# Patient Record
Sex: FEMALE | Race: WHITE | NOT HISPANIC OR LATINO | Employment: OTHER | URBAN - METROPOLITAN AREA
[De-identification: names, ages, dates, MRNs, and addresses within clinical notes are randomized per-mention and may not be internally consistent; named-entity substitution may affect disease eponyms.]

---

## 2017-05-01 ENCOUNTER — ALLSCRIPTS OFFICE VISIT (OUTPATIENT)
Dept: OTHER | Facility: OTHER | Age: 72
End: 2017-05-01

## 2017-06-06 ENCOUNTER — ALLSCRIPTS OFFICE VISIT (OUTPATIENT)
Dept: OTHER | Facility: OTHER | Age: 72
End: 2017-06-06

## 2017-07-17 ENCOUNTER — ALLSCRIPTS OFFICE VISIT (OUTPATIENT)
Dept: OTHER | Facility: OTHER | Age: 72
End: 2017-07-17

## 2017-08-04 DIAGNOSIS — C50.919 MALIGNANT NEOPLASM OF FEMALE BREAST (HCC): ICD-10-CM

## 2017-08-09 ENCOUNTER — ALLSCRIPTS OFFICE VISIT (OUTPATIENT)
Dept: OTHER | Facility: OTHER | Age: 72
End: 2017-08-09

## 2017-08-30 ENCOUNTER — ALLSCRIPTS OFFICE VISIT (OUTPATIENT)
Dept: OTHER | Facility: OTHER | Age: 72
End: 2017-08-30

## 2017-11-14 ENCOUNTER — ALLSCRIPTS OFFICE VISIT (OUTPATIENT)
Dept: OTHER | Facility: OTHER | Age: 72
End: 2017-11-14

## 2018-01-09 NOTE — PSYCH
Psych Med Mgmt    Appearance: was calm and cooperative, adequate hygiene and grooming and good eye contact  Observed mood: was dysphoric  Observed mood: affect was blunted and affect was constricted, but affect appropriate  Speech: garbled  Thought processes: tangential  and disorganized  Hallucinations: tactile hallucinations  Thought Content: persecutory delusions  Abnormal Thoughts: The patient has no suicidal thoughts and no homicidal thoughts  Orientation: The patient is oriented to person, place and time  Recent and Remote Memory: short term memory intact and long term memory intact  Attention Span And Concentration: concentration impaired  Insight: Poor insight  Judgment: Her judgment was impaired  Muscle Strength And Tone  Muscle strength and tone were normal  Normal gait and station  The patient is experiencing no localized pain  Treatment Recommendations: Increase Invega to 9 mg daily and continue with other medications the same  Return to the office in 6 weeks  Risks, Benefits And Possible Side Effects Of Medications: Risks, benefits, and possible side effects of medications explained to patient and patient verbalizes understanding  She reports normal appetite, normal energy level, no weight change and normal number of sleep hours  The patient was seen for continuing care and pharmacotherapy of Schizoaffective disorder and was accompanied I her ICM  The patient was recently admitted to [facility] older adult unit and her medications were changed  She was taken off of Haldol decanoate and Saphris and placed on Invega and Haldol oral  She was maintained on divalproex but carbamazepine was discontinued  According to her ICM, the patient has decompensated  She is now fixated on somebody come into her room every night having sex with her and she shows some scratch marks on her wrists as the evidence  She has loose associations and inappropriate affect  However, she has not been crying as much  Assessment    1  Schizoaffective disorder (295 70) (F25 9)    Plan    1  Paliperidone ER 9 MG Oral Tablet Extended Release 24 Hour (Invega); TAKE 1   TABLET DAILY    Review of Systems    Constitutional: No fever, no chills, feels well, no tiredness, no recent weight gain or loss  Cardiovascular: no complaints of slow or fast heart rate, no chest pain, no palpitations  Respiratory: no complaints of shortness of breath, no wheezing, no dyspnea on exertion  Gastrointestinal: no complaints of abdominal pain, no constipation, no nausea, no diarrhea, no vomiting  Genitourinary: no complaints of dysuria, no incontinence, no pelvic pain, no urinary frequency  Musculoskeletal: no complaints of arthralgia, no myalgias, no limb pain, no joint stiffness  Integumentary: "scratches all over my body"  Neurological: no complaints of headache, no confusion, no numbness, no dizziness  Active Problems    1  Adenocarcinoma of breast (174 9) (C50 919)   2  Macrocytosis (289 89) (D75 89)   3  Schizoaffective disorder (295 70) (F25 9)    Allergies    1  No Known Drug Allergies    Current Meds   1  Ammonium Lactate 12 % External Lotion; Therapy: 43PTG8946 to Recorded   2  Aspirin EC 81 MG Oral Tablet Delayed Release; Therapy: (Recorded:06Jun2012) to Recorded   3  BuPROPion HCl ER (SR) 150 MG Oral Tablet Extended Release 12 Hour; TAKE 1   TABLET TWICE DAILY; Therapy: 81BPI8504 to (Evaluate:14Tnf8209); Last CC:00ZLG7020 Ordered   4  ClonazePAM 1 MG Oral Tablet; TAKE 1 TABLET AT BEDTIME (8pm); Therapy: 89EXZ8032 to (Last WP:48CVL4924) Ordered   5  Divalproex Sodium 125 MG Oral Capsule Delayed Release Sprinkle; Take 2 capsules   PO four times daily; Therapy: 90ZXS3519 to (Evaluate:33Xbv5757); Last CK:37NAC5425 Ordered   6  Docusate Sodium 100 MG Oral Tablet; Therapy: (Recorded:11Jun2014) to Recorded   7  Famotidine 20 MG Oral Tablet;    Therapy: (Recorded:06Jun2012) to Recorded   8  Haloperidol 2 MG Oral Tablet; Take 1 tablet PO two times daily; Therapy: 30YIO7095 to (Evaluate:58Pmq3130); Last TV:01VHE5748 Ordered   9  Oscal 500/200 D-3 TABS; Therapy: (Recorded:06Jun2012) to Recorded   10  Paliperidone ER 6 MG Oral Tablet Extended Release 24 Hour; TAKE 1 TABLET DAILY; Therapy: 53KTS3874 to (Evaluate:21Ndj8034); Last ZD:78BAO1095 Ordered   11  Senna Lax 8 6 MG Oral Tablet; Therapy: (Recorded:06Jun2012) to Recorded   12  X-Viate 40 % CREA; Therapy: 59IUP6694 to Recorded    Family Psych History  Mother    1  No pertinent family history    Social History    · Never A Smoker    End of Encounter Meds    1  BuPROPion HCl ER (SR) 150 MG Oral Tablet Extended Release 12 Hour; TAKE 1   TABLET TWICE DAILY; Therapy: 62RAA4598 to (Evaluate:89Pxv3380); Last DD:63MGI0721 Ordered   2  ClonazePAM 1 MG Oral Tablet; TAKE 1 TABLET AT BEDTIME (8pm); Therapy: 23OBQ1694 to (Last YH:69PNW4970) Ordered   3  Divalproex Sodium 125 MG Oral Capsule Delayed Release Sprinkle (Depakote   Sprinkles); Take 2 capsules PO four times daily; Therapy: 44BGN7018 to (Evaluate:94Qkl0618); Last NW:73KUT3202 Ordered   4  Haloperidol 2 MG Oral Tablet; Take 1 tablet PO two times daily; Therapy: 12CYQ7263 to (Evaluate:37Rqu7498); Last XY:28NGR0774 Ordered   5  Paliperidone ER 9 MG Oral Tablet Extended Release 24 Hour (Invega); TAKE 1 TABLET   DAILY; Therapy: 21LYK8932 to (Evaluate:47Ctf0947)  Requested for: 26DMH4654; Last   Rx:06Jun2017 Ordered    6  Ammonium Lactate 12 % External Lotion; Therapy: 59YIA3974 to Recorded   7  Aspirin EC 81 MG Oral Tablet Delayed Release; Therapy: (Recorded:06Jun2012) to Recorded   8  Docusate Sodium 100 MG Oral Tablet; Therapy: (Recorded:11Jun2014) to Recorded   9  Famotidine 20 MG Oral Tablet; Therapy: (Recorded:06Jun2012) to Recorded   10  Oscal 500/200 D-3 TABS; Therapy: (Recorded:06Jun2012) to Recorded   11  Senna Lax 8 6 MG Oral Tablet;     Therapy: (JQBCOF:53DMA3863) to Recorded   12  X-Viate 40 % CREA;     Therapy: 80CTB9056 to Recorded    Future Appointments    Date/Time Provider Specialty Site   07/26/2017 01:00 PM Celine Alejandre MD Hematology Oncology [facility]     Signatures   Electronically signed by : Keren Vidales MD; Jun 6 2017  4:36PM EST                       (Author)

## 2018-01-10 NOTE — PSYCH
Psych Med Mgmt    Appearance: was calm and cooperative  Observed mood: mood appropriate  Observed mood: Amie Blend affect inappropriate  Speech: a normal rate  Thought processes: disorganized  Hallucinations: auditory hallucinations and visual hallucinations  Thought Content: persecutory delusions  Abnormal Thoughts: The patient has no suicidal thoughts and no homicidal thoughts  Orientation: The patient is oriented to person, place and time  Recent and Remote Memory: short term memory intact and long term memory intact  Attention Span And Concentration: concentration impaired  Insight: Limited insight  Judgment: Her judgment was impaired  Treatment Recommendations: follow up in 3 months  She reports normal appetite, normal energy level, no weight change and normal number of sleep hours  Patient is accompanied by her ICM  She reports that she has been feeling well  She is compliant with medications and is tolerating them without issue  The patient does express some visual and auditory hallucinations, stating that she believes people are making threats to keep her from going to the bathroom at night  She does have occasional episodes of crying when the voices offend her, but she has not had any episodes of aggression or behavioral outbursts  She is eating and sleeping well  Assessment    1  Schizoaffective disorder (295 70) (F25 9)    Review of Systems    Constitutional: No fever, no chills, feels well, no tiredness, no recent weight gain or loss  Cardiovascular: occasional ankle edema, but no complaints of slow or fast heart rate, no chest pain, no palpitations  Respiratory: no complaints of shortness of breath, no wheezing, no dyspnea on exertion  Gastrointestinal: no complaints of abdominal pain, no constipation, no nausea, no diarrhea, no vomiting  Genitourinary: no complaints of dysuria, no incontinence, no pelvic pain, no urinary frequency     Musculoskeletal: no complaints of arthralgia, no myalgias, no limb pain, no joint stiffness  Integumentary: no complaints of skin rash, no itching, no dry skin  Neurological: no complaints of headache, no confusion, no numbness, no dizziness  Active Problems    1  Adenocarcinoma of breast (174 9) (C50 919)   2  Macrocytosis (289 89) (D75 89)   3  Schizoaffective disorder (295 70) (F25 9)    Allergies    1  No Known Drug Allergies    Current Meds   1  Ammonium Lactate 12 % External Lotion; Therapy: 73DUL5781 to Recorded   2  Aspirin EC 81 MG Oral Tablet Delayed Release; Therapy: (Recorded:06Jun2012) to Recorded   3  BusPIRone HCl - 15 MG Oral Tablet; Take 1 tablet twice daily  Requested for: 72GGT2552;   Last Rx:22Jun2016 Ordered   4  CarBAMazepine  MG Oral Tablet Extended Release 12 Hour; TAKE 1 TABLET   TWICE DAILY  Requested for: 77YMP3094; Last Rx:22Jun2016 Ordered   5  Divalproex Sodium  MG Oral Tablet Extended Release 24 Hour; Take 1 tablet   twice daily  Requested for: 78JRS8435; Last Rx:22Jun2016 Ordered   6  Docusate Sodium 100 MG Oral Tablet; Therapy: (Recorded:11Jun2014) to Recorded   7  Famotidine 20 MG Oral Tablet; Therapy: (Recorded:06Jun2012) to Recorded   8  FLUoxetine HCl - 20 MG Oral Capsule; TAKE ONE CAPSULE BY MOUTH EVERY DAY; Therapy: 89BFY9815 to (Evaluate:17Vzx8046)  Requested for: 47HZD2087; Last   Rx:22Jun2016 Ordered   9  Haloperidol Decanoate 100 MG/ML Intramuscular Solution; INJECT 1 5 ML Other 0nce a   month  Requested for: 52USH3160; Last Rx:22Jun2016 Ordered   10  LORazepam 1 MG Oral Tablet; TAKE 1 TABLET 3 times daily; Last Rx:22Jun2016    Ordered   11  Oscal 500/200 D-3 TABS; Therapy: (Recorded:06Jun2012) to Recorded   12  Saphris 10 MG Sublingual Tablet Sublingual; 1 TAB UNDER THE TONGUE TWICE DAILY    DX:PSYCHOSIS *SPECIAL CYCLE*;    Therapy: 20Jun2016 to (Last Rx:22Jun2016)  Requested for: 00GOW0547 Ordered   13  Senna Lax 8 6 MG Oral Tablet;     Therapy: (HTHTNIJZ:44MXC8591) to Recorded   14  Trihexyphenidyl HCl - 2 MG Oral Tablet; Take 1 tablet twice daily  Requested for:    55MOG9048; Last Rx:22Jun2016 Ordered   15  X-Viate 40 % CREA; Therapy: 42FFP4266 to Recorded    Family Psych History  Mother    1  No pertinent family history    Social History    · Never A Smoker    End of Encounter Meds    1  BusPIRone HCl - 15 MG Oral Tablet; Take 1 tablet twice daily  Requested for: 22Jun2016;   Last Rx:22Jun2016 Ordered   2  CarBAMazepine  MG Oral Tablet Extended Release 12 Hour (TEGretol-XR); TAKE   1 TABLET TWICE DAILY  Requested for: 76QEY3732; Last Rx:22Jun2016 Ordered   3  Divalproex Sodium  MG Oral Tablet Extended Release 24 Hour; Take 1 tablet   twice daily  Requested for: 46VNH2092; Last Rx:22Jun2016 Ordered   4  FLUoxetine HCl - 20 MG Oral Capsule; TAKE ONE CAPSULE BY MOUTH EVERY DAY; Therapy: 53IDB5516 to (Evaluate:46Fzd3009)  Requested for: 67GKD1179; Last   Rx:22Jun2016 Ordered   5  Haloperidol Decanoate 100 MG/ML Intramuscular Solution; INJECT 1 5 ML Other 0nce a   month  Requested for: 31MAV7535; Last Rx:22Jun2016 Ordered   6  LORazepam 1 MG Oral Tablet; TAKE 1 TABLET 3 times daily; Last Rx:22Jun2016   Ordered   7  Saphris 10 MG Sublingual Tablet Sublingual; 1 TAB UNDER THE TONGUE TWICE DAILY   DX:PSYCHOSIS *SPECIAL CYCLE*;   Therapy: 20Jun2016 to (Last Rx:22Jun2016)  Requested for: 60GUK4101 Ordered   8  Trihexyphenidyl HCl - 2 MG Oral Tablet; Take 1 tablet twice daily  Requested for:   07GDJ8759; Last Rx:22Jun2016 Ordered    9  Ammonium Lactate 12 % External Lotion; Therapy: 91ASB6318 to Recorded   10  Aspirin EC 81 MG Oral Tablet Delayed Release; Therapy: (Recorded:06Jun2012) to Recorded   11  Docusate Sodium 100 MG Oral Tablet; Therapy: (Recorded:11Jun2014) to Recorded   12  Famotidine 20 MG Oral Tablet; Therapy: (Recorded:06Jun2012) to Recorded   13  Oscal 500/200 D-3 TABS; Therapy: (Recorded:06Jun2012) to Recorded   14  Senna Lax 8 6 MG Oral Tablet; Therapy: (Recorded:06Jun2012) to Recorded   15  X-Viate 40 % CREA;     Therapy: 22CHN6360 to Recorded    Future Appointments    Date/Time Provider Specialty Site   07/26/2017 01:00 PM Haleigh Stewart MD Hematology Oncology [facility]     Signatures   Electronically signed by : Sergio Martinez MD; Dec 28 2016  3:55PM EST                       (Author)

## 2018-01-13 VITALS
WEIGHT: 181.13 LBS | RESPIRATION RATE: 16 BRPM | HEART RATE: 86 BPM | BODY MASS INDEX: 28.43 KG/M2 | TEMPERATURE: 96.1 F | HEIGHT: 67 IN | OXYGEN SATURATION: 97 % | DIASTOLIC BLOOD PRESSURE: 72 MMHG | SYSTOLIC BLOOD PRESSURE: 114 MMHG

## 2018-01-13 NOTE — PSYCH
Psych Med Mgmt    Appearance: was calm and cooperative and poor eye contact  Observed mood: was dysphoric  Observed mood: affect was blunted and affect was constricted   some grimacing and also inappropriate/mood incongruent affect  Speech: a normal rate  Thought processes: tangential  and disorganized  Hallucinations: auditory hallucinations   voices make threatening comments  Thought Content: persecutory delusions  Abnormal Thoughts: The patient has no suicidal thoughts and no homicidal thoughts  Orientation: The patient is oriented to time  Recent and Remote Memory: short term memory intact and long term memory intact  Attention Span And Concentration: concentration impaired  Insight: Poor insight  Judgment: Her judgment was limited  Muscle Strength And Tone  mlid oral dyskinesia  The patient is experiencing no localized pain  She reports normal appetite, normal energy level, no weight change and normal number of sleep hours  The patient was seen for continuing care and pharmacotherapy accompanied by her ICM  There has been no change in her clinical status  She continues to have residual psychotic symptoms  She believes that somebody goes to her group home at night and gives other residents ECT and she also believes that the other residents 1 her to fail in life  Hallucinations are a sporadic  Voices tell her that if she has a bowel movement she will be sentenced to penitentiary  However the patient has not had any significant behavioral issues and seems to be at her baseline  Other than mild oral dyskinesia, she didn't appear to have any other side effects  Assessment    1  Schizoaffective disorder (295 70) (F25 9)    Plan    1  BusPIRone HCl - 15 MG Oral Tablet; Take 1 tablet twice daily   2  CarBAMazepine  MG Oral Tablet Extended Release 12 Hour   (TEGretol-XR); TAKE 1 TABLET TWICE DAILY   3  Divalproex Sodium  MG Oral Tablet Extended Release 24 Hour;  Take 1   tablet twice daily   4  FLUoxetine HCl - 20 MG Oral Capsule; TAKE ONE CAPSULE BY MOUTH EVERY   DAY   5  Haloperidol Decanoate 100 MG/ML Intramuscular Solution; INJECT 1 5 ML Other   0nce a month   6  LORazepam 1 MG Oral Tablet; TAKE 1 TABLET 3 times daily   7  Saphris 10 MG Sublingual Tablet Sublingual; PLACE 1 TABLET Twice daily   8  Trihexyphenidyl HCl - 2 MG Oral Tablet; Take 1 tablet twice daily    Review of Systems    Constitutional: No fever, no chills, feels well, no tiredness, no recent weight gain or loss  Cardiovascular: no complaints of slow or fast heart rate, no chest pain, no palpitations  Respiratory: no complaints of shortness of breath, no wheezing, no dyspnea on exertion  Gastrointestinal: no complaints of abdominal pain, no constipation, no nausea, no diarrhea, no vomiting  Genitourinary: no complaints of dysuria, no incontinence, no pelvic pain, no urinary frequency  Musculoskeletal: no complaints of arthralgia, no myalgias, no limb pain, no joint stiffness  Integumentary: no complaints of skin rash, no itching, no dry skin  Neurological: no complaints of headache, no confusion, no numbness, no dizziness  Active Problems    1  Adenocarcinoma of breast (174 9) (C50 919)   2  Macrocytosis (289 89) (D75 89)   3  Schizoaffective disorder (295 70) (F25 9)    Allergies    1  No Known Drug Allergies    Current Meds   1  Ammonium Lactate 12 % External Lotion; Therapy: 61GLL0023 to Recorded   2  Aspirin EC 81 MG Oral Tablet Delayed Release; Therapy: (Recorded:06Jun2012) to Recorded   3  BusPIRone HCl - 15 MG Oral Tablet; Take 1 tablet twice daily; Last Rx:29Oct2015   Ordered   4  CarBAMazepine  MG Oral Tablet Extended Release 12 Hour; TAKE 1 TABLET   TWICE DAILY; Last Rx:29Oct2015 Ordered   5  Divalproex Sodium  MG Oral Tablet Extended Release 24 Hour; Take 1 tablet   twice daily; Last Rx:29Oct2015 Ordered   6  Docusate Sodium 100 MG Oral Tablet;    Therapy: (Recorded:11Jun2014) to Recorded   7  Famotidine 20 MG Oral Tablet; Therapy: (Recorded:06Jun2012) to Recorded   8  FLUoxetine HCl - 20 MG Oral Capsule; TAKE ONE CAPSULE BY MOUTH EVERY DAY; Therapy: 15UOH4302 to (Fidel Jackson)  Requested for: 0490 39 07 81; Last   Rx:29Oct2015 Ordered   9  Haloperidol Decanoate 100 MG/ML Intramuscular Solution; INJECT 1 5 ML Other 0nce a   month; Last Rx:29Oct2015 Ordered   10  LORazepam 1 MG Oral Tablet; TAKE 1 TABLET 3 times daily; Last Rx:29Oct2015 Ordered   11  Oscal 500/200 D-3 TABS; Therapy: (Recorded:06Jun2012) to Recorded   12  Saphris 10 MG Sublingual Tablet Sublingual; PLACE 1 TABLET Twice daily; Last    Rx:29Oct2015 Ordered   13  Senna Lax 8 6 MG Oral Tablet; Therapy: (Recorded:06Jun2012) to Recorded   14  Trihexyphenidyl HCl - 2 MG Oral Tablet; Take 1 tablet twice daily; Last Rx:29Oct2015    Ordered   15  X-Viate 40 % CREA; Therapy: 95VZA5945 to Recorded    Family Psych History    1  No pertinent family history    Social History    · Never A Smoker    End of Encounter Meds    1  BusPIRone HCl - 15 MG Oral Tablet; Take 1 tablet twice daily; Last Rx:02Hbw5745   Ordered   2  CarBAMazepine  MG Oral Tablet Extended Release 12 Hour (TEGretol-XR); TAKE   1 TABLET TWICE DAILY; Last Rx:54Tiu3075 Ordered   3  Divalproex Sodium  MG Oral Tablet Extended Release 24 Hour; Take 1 tablet   twice daily; Last Rx:08Hlr2478 Ordered   4  FLUoxetine HCl - 20 MG Oral Capsule; TAKE ONE CAPSULE BY MOUTH EVERY DAY; Therapy: 42XDV0751 to (Evaluate:93Gfo0606)  Requested for: 00Rbe2859; Last   Rx:58Yxn0999 Ordered   5  Haloperidol Decanoate 100 MG/ML Intramuscular Solution; INJECT 1 5 ML Other 0nce a   month; Last Rx:25Cuh9861 Ordered   6  LORazepam 1 MG Oral Tablet; TAKE 1 TABLET 3 times daily; Last Rx:14Eih8050   Ordered   7  Saphris 10 MG Sublingual Tablet Sublingual; PLACE 1 TABLET Twice daily; Last   Rx:34Etb2141 Ordered   8  Trihexyphenidyl HCl - 2 MG Oral Tablet;  Take 1 tablet twice daily; Last Rx:85Xrw7572   Ordered    9  Ammonium Lactate 12 % External Lotion; Therapy: 94AKH7603 to Recorded   10  Aspirin EC 81 MG Oral Tablet Delayed Release; Therapy: (Recorded:06Jun2012) to Recorded   11  Docusate Sodium 100 MG Oral Tablet; Therapy: (Recorded:11Jun2014) to Recorded   12  Famotidine 20 MG Oral Tablet; Therapy: (Recorded:06Jun2012) to Recorded   13  Oscal 500/200 D-3 TABS; Therapy: (Recorded:06Jun2012) to Recorded   14  Senna Lax 8 6 MG Oral Tablet; Therapy: (Recorded:06Jun2012) to Recorded   15  X-Viate 40 % CREA;     Therapy: 53RGY4143 to Recorded    Future Appointments    Date/Time Provider Specialty Site   06/08/2016 01:00 PM Siria Fox MD Hematology Oncology [facility]     Signatures   Electronically signed by : Paula Schneider MD; Feb 23 2016  3:51PM EST                       (Author)

## 2018-01-14 NOTE — PSYCH
Psych Med Mgmt    Appearance: adequate hygiene and grooming and good eye contact   squinting frequently  Observed mood: uncertain  Observed mood:  inappropriate and shallow at times  Speech: a normal rate and speech soft  Thought processes: disorganized  Thought Content: persecutory delusions  Abnormal Thoughts: The patient has no suicidal thoughts and no homicidal thoughts  Orientation: The patient is oriented to person, place and time  Recent and Remote Memory: short term memory intact and long term memory intact  Attention Span And Concentration: concentration impaired  Insight: Poor insight  Judgment: Her judgment was impaired  Muscle Strength And Tone  Muscle strength and tone were normal  Normal gait and station  Fund of knowledge: Patient displays adequate knowledge of current events  The patient is experiencing no localized pain  She reports normal appetite, decreased energy, no weight change and decrease in number of sleep hours   seen for schizoaffective disorder accompanied by her ICM  No changes in clinical status with persistence of residual signs and symptoms  No disruptive behaviors  She often talks about police coming to take her to have ECT Or that other people Getting to her bank accounts and things like that  No evidence of tardive dyskinesia  Assessment    1  Schizoaffective disorder (295 70) (F25 9)    Review of Systems    Constitutional: No fever, no chills, feels well, no tiredness, no recent weight gain or loss  Cardiovascular: no complaints of slow or fast heart rate, no chest pain, no palpitations  Respiratory: no complaints of shortness of breath, no wheezing, no dyspnea on exertion  Gastrointestinal: no complaints of abdominal pain, no constipation, no nausea, no diarrhea, no vomiting  Genitourinary: no complaints of dysuria, no incontinence, no pelvic pain, no urinary frequency     Musculoskeletal: no complaints of arthralgia, no myalgias, no limb pain, no joint stiffness  Active Problems    1  Adenocarcinoma of breast (174 9) (C50 919)   2  Macrocytosis (289 89) (D75 89)   3  Schizoaffective disorder (295 70) (F25 9)    Allergies    1  No Known Drug Allergies    Current Meds   1  Ammonium Lactate 12 % External Lotion; Therapy: 09PPL6204 to Recorded   2  Aspirin EC 81 MG Oral Tablet Delayed Release; Therapy: (Recorded:06Jun2012) to Recorded   3  BusPIRone HCl - 15 MG Oral Tablet; Take 1 tablet twice daily  Requested for: 64LNC5153;   Last Rx:22Jun2016 Ordered   4  CarBAMazepine  MG Oral Tablet Extended Release 12 Hour; TAKE 1 TABLET   TWICE DAILY  Requested for: 30QJW7286; Last Rx:22Jun2016 Ordered   5  Divalproex Sodium  MG Oral Tablet Extended Release 24 Hour; Take 1 tablet   twice daily  Requested for: 25KCU9094; Last Rx:22Jun2016 Ordered   6  Docusate Sodium 100 MG Oral Tablet; Therapy: (Recorded:11Jun2014) to Recorded   7  Famotidine 20 MG Oral Tablet; Therapy: (Recorded:06Jun2012) to Recorded   8  FLUoxetine HCl - 20 MG Oral Capsule; TAKE ONE CAPSULE BY MOUTH EVERY DAY; Therapy: 04EZI6604 to (Evaluate:02Sdv3134)  Requested for: 95QFY3028; Last   Rx:22Jun2016 Ordered   9  Haloperidol Decanoate 100 MG/ML Intramuscular Solution; INJECT 1 5 ML Other 0nce a   month  Requested for: 90XHK3865; Last Rx:22Jun2016 Ordered   10  LORazepam 1 MG Oral Tablet; TAKE 1 TABLET 3 times daily; Last Rx:22Jun2016    Ordered   11  Oscal 500/200 D-3 TABS; Therapy: (Recorded:06Jun2012) to Recorded   12  Saphris 10 MG Sublingual Tablet Sublingual; 1 TAB UNDER THE TONGUE TWICE DAILY    DX:PSYCHOSIS *SPECIAL CYCLE*;    Therapy: 20Jun2016 to (Last Rx:22Jun2016)  Requested for: 19PZO6442 Ordered   13  Senna Lax 8 6 MG Oral Tablet; Therapy: (Recorded:06Jun2012) to Recorded   14  Trihexyphenidyl HCl - 2 MG Oral Tablet; Take 1 tablet twice daily  Requested for:    31KHT6509; Last Rx:22Jun2016 Ordered   15  X-Viate 40 % CREA;     Therapy: 81OYF6426 to Recorded    Family Psych History  Mother    1  No pertinent family history    Social History    · Never A Smoker    End of Encounter Meds    1  BusPIRone HCl - 15 MG Oral Tablet; Take 1 tablet twice daily  Requested for: 22Jun2016;   Last Rx:22Jun2016 Ordered   2  CarBAMazepine  MG Oral Tablet Extended Release 12 Hour (TEGretol-XR); TAKE   1 TABLET TWICE DAILY  Requested for: 79HKF2451; Last Rx:22Jun2016 Ordered   3  Divalproex Sodium  MG Oral Tablet Extended Release 24 Hour; Take 1 tablet   twice daily  Requested for: 46TMC9887; Last Rx:22Jun2016 Ordered   4  FLUoxetine HCl - 20 MG Oral Capsule; TAKE ONE CAPSULE BY MOUTH EVERY DAY; Therapy: 91VHA3910 to (Evaluate:72Rup3288)  Requested for: 56XNO6969; Last   Rx:22Jun2016 Ordered   5  Haloperidol Decanoate 100 MG/ML Intramuscular Solution; INJECT 1 5 ML Other 0nce a   month  Requested for: 84QOX3796; Last Rx:22Jun2016 Ordered   6  LORazepam 1 MG Oral Tablet; TAKE 1 TABLET 3 times daily; Last Rx:22Jun2016   Ordered   7  Saphris 10 MG Sublingual Tablet Sublingual; 1 TAB UNDER THE TONGUE TWICE DAILY   DX:PSYCHOSIS *SPECIAL CYCLE*;   Therapy: 20Jun2016 to (Last Rx:22Jun2016)  Requested for: 76SLQ1881 Ordered   8  Trihexyphenidyl HCl - 2 MG Oral Tablet; Take 1 tablet twice daily  Requested for:   05VBY6353; Last Rx:22Jun2016 Ordered    9  Ammonium Lactate 12 % External Lotion; Therapy: 48KSY6975 to Recorded   10  Aspirin EC 81 MG Oral Tablet Delayed Release; Therapy: (Recorded:06Jun2012) to Recorded   11  Docusate Sodium 100 MG Oral Tablet; Therapy: (Recorded:11Jun2014) to Recorded   12  Famotidine 20 MG Oral Tablet; Therapy: (Recorded:06Jun2012) to Recorded   13  Oscal 500/200 D-3 TABS; Therapy: (Recorded:06Jun2012) to Recorded   14  Senna Lax 8 6 MG Oral Tablet; Therapy: (Recorded:06Jun2012) to Recorded   15  X-Viate 40 % CREA;     Therapy: 30HJA8134 to Recorded    Future Appointments    Date/Time Provider Specialty Site 07/26/2017 01:00 PM Olinda Campos MD Hematology Oncology [facility]     Signatures   Electronically signed by : Sophie Zhu MD; Oct  5 2016  3:16PM EST                       (Author)

## 2018-01-15 NOTE — PSYCH
Psych Med Mgmt    Appearance: adequate hygiene and grooming and poor eye contact  Observed mood: euthymic  Observed mood:  inappropriate laughing  Speech: a normal rate  Thought processes: tangential  and disorganized  Hallucinations: auditory hallucinations  Thought Content: paranoid ideation  Abnormal Thoughts: The patient has no suicidal thoughts and no homicidal thoughts  Orientation: The patient is oriented to person, place and time  Attention Span And Concentration: concentration impaired  Insight: Poor insight  Judgment: Her judgment was limited  The patient is experiencing no localized pain  Treatment Recommendations: Continue with same medications and return to the office in 3 months  Risks, Benefits And Possible Side Effects Of Medications: Patient does not verbalize understanding at this time and will require further explanation  She reports normal appetite, normal energy level, no weight change and normal number of sleep hours  The patient was seen for treatment of his schizoaffective disorder accompanied by her ICM  She continues to have residual symptoms of his schizophrenia such as ideas of reference and auditory hallucinations and inappropriate affect  No EPS was noted  Assessment    1  Schizoaffective disorder (295 70) (F25 9)    Plan    1  BusPIRone HCl - 15 MG Oral Tablet; Take 1 tablet twice daily   2  CarBAMazepine  MG Oral Tablet Extended Release 12 Hour   (TEGretol-XR); TAKE 1 TABLET TWICE DAILY   3  Divalproex Sodium  MG Oral Tablet Extended Release 24 Hour; Take 1   tablet twice daily   4  FLUoxetine HCl - 20 MG Oral Capsule; TAKE ONE CAPSULE BY MOUTH EVERY   DAY   5  Haloperidol Decanoate 100 MG/ML Intramuscular Solution; INJECT 1 5 ML Other   0nce a month   6  LORazepam 1 MG Oral Tablet; TAKE 1 TABLET 3 times daily   7   Saphris 10 MG Sublingual Tablet Sublingual; 1 TAB UNDER THE TONGUE   TWICE DAILY DX:PSYCHOSIS *SPECIAL CYCLE*   8  Trihexyphenidyl HCl - 2 MG Oral Tablet; Take 1 tablet twice daily    Review of Systems    Constitutional: No fever, no chills, feels well, no tiredness, no recent weight gain or loss  Cardiovascular: no complaints of slow or fast heart rate, no chest pain, no palpitations  Respiratory: no complaints of shortness of breath, no wheezing, no dyspnea on exertion  Gastrointestinal: no complaints of abdominal pain, no constipation, no nausea, no diarrhea, no vomiting  Musculoskeletal: no complaints of arthralgia, no myalgias, no limb pain, no joint stiffness  Active Problems    1  Adenocarcinoma of breast (174 9) (C50 919)   2  Macrocytosis (289 89) (D75 89)   3  Schizoaffective disorder (295 70) (F25 9)    Allergies    1  No Known Drug Allergies    Current Meds   1  Ammonium Lactate 12 % External Lotion; Therapy: 14WXP6927 to Recorded   2  Aspirin EC 81 MG Oral Tablet Delayed Release; Therapy: (Recorded:06Jun2012) to Recorded   3  BusPIRone HCl - 15 MG Oral Tablet; Take 1 tablet twice daily  Requested for:   23Feb2016; Last Rx:23Feb2016 Ordered   4  CarBAMazepine  MG Oral Tablet Extended Release 12 Hour; TAKE 1 TABLET   TWICE DAILY  Requested for: 23Feb2016; Last Rx:23Feb2016 Ordered   5  Divalproex Sodium  MG Oral Tablet Extended Release 24 Hour; Take 1 tablet   twice daily  Requested for: 23Feb2016; Last Rx:23Feb2016 Ordered   6  Docusate Sodium 100 MG Oral Tablet; Therapy: (Recorded:11Jun2014) to Recorded   7  Famotidine 20 MG Oral Tablet; Therapy: (Recorded:06Jun2012) to Recorded   8  FLUoxetine HCl - 20 MG Oral Capsule; TAKE ONE CAPSULE BY MOUTH EVERY DAY; Therapy: 65NTJ8058 to (Evaluate:08Czw2877)  Requested for: 23Feb2016; Last   Rx:23Feb2016 Ordered   9  Haloperidol Decanoate 100 MG/ML Intramuscular Solution; INJECT 1 5 ML Other 0nce a   month; Last Rx:23Feb2016 Ordered   10  LORazepam 1 MG Oral Tablet; TAKE 1 TABLET 3 times daily; Last Rx:23Feb2016    Ordered   11   Dariana White 500/200 D-3 TABS; Therapy: (Recorded:06Jun2012) to Recorded   12  Saphris 10 MG Sublingual Tablet Sublingual; 1 TAB UNDER THE TONGUE TWICE DAILY    DX:PSYCHOSIS *SPECIAL CYCLE*;    Therapy: 20Jun2016 to (Last Rx:20Jun2016)  Requested for: 20Jun2016 Ordered   13  Senna Lax 8 6 MG Oral Tablet; Therapy: (Recorded:06Jun2012) to Recorded   14  Trihexyphenidyl HCl - 2 MG Oral Tablet; Take 1 tablet twice daily  Requested for:    72Elg9519; Last Rx:98Dfp8236 Ordered   15  X-Viate 40 % CREA; Therapy: 65NWO7275 to Recorded    Family Psych History  Mother    1  No pertinent family history    Social History    · Never A Smoker    End of Encounter Meds    1  BusPIRone HCl - 15 MG Oral Tablet; Take 1 tablet twice daily  Requested for: 22Jun2016;   Last Rx:22Jun2016 Ordered   2  CarBAMazepine  MG Oral Tablet Extended Release 12 Hour (TEGretol-XR); TAKE   1 TABLET TWICE DAILY  Requested for: 46GGB8630; Last Rx:22Jun2016 Ordered   3  Divalproex Sodium  MG Oral Tablet Extended Release 24 Hour; Take 1 tablet   twice daily  Requested for: 05PWV6558; Last Rx:22Jun2016 Ordered   4  FLUoxetine HCl - 20 MG Oral Capsule; TAKE ONE CAPSULE BY MOUTH EVERY DAY; Therapy: 99UOT9786 to (Evaluate:08Xwm2598)  Requested for: 71QUO2445; Last   Rx:22Jun2016 Ordered   5  Haloperidol Decanoate 100 MG/ML Intramuscular Solution; INJECT 1 5 ML Other 0nce a   month  Requested for: 39HAK7286; Last Rx:22Jun2016 Ordered   6  LORazepam 1 MG Oral Tablet; TAKE 1 TABLET 3 times daily; Last Rx:22Jun2016   Ordered   7  Saphris 10 MG Sublingual Tablet Sublingual; 1 TAB UNDER THE TONGUE TWICE DAILY   DX:PSYCHOSIS *SPECIAL CYCLE*;   Therapy: 20Jun2016 to (Last Rx:22Jun2016)  Requested for: 31MMB4727 Ordered   8  Trihexyphenidyl HCl - 2 MG Oral Tablet; Take 1 tablet twice daily  Requested for:   78LXR2029; Last Rx:22Jun2016 Ordered    9  Ammonium Lactate 12 % External Lotion; Therapy: 74FAA9529 to Recorded   10   Aspirin EC 81 MG Oral Tablet Delayed Release; Therapy: (Recorded:06Jun2012) to Recorded   11  Docusate Sodium 100 MG Oral Tablet; Therapy: (Recorded:11Jun2014) to Recorded   12  Famotidine 20 MG Oral Tablet; Therapy: (Recorded:06Jun2012) to Recorded   13  Oscal 500/200 D-3 TABS; Therapy: (Recorded:06Jun2012) to Recorded   14  Senna Lax 8 6 MG Oral Tablet; Therapy: (Recorded:06Jun2012) to Recorded   15  X-Viate 40 % CREA;     Therapy: 71VAJ4148 to Recorded    Future Appointments    Date/Time Provider Specialty Site   07/27/2016 01:00 PM Haleigh Stewart MD Hematology Oncology [facility]     Signatures   Electronically signed by : Sergio Martinez MD; Jun 22 2016  4:53PM EST                       (Author)

## 2018-01-15 NOTE — PSYCH
Psych Med Mgmt    Appearance: was calm and cooperative, adequate hygiene and grooming and good eye contact  Observed mood: euthymic  Observed mood:  inappropriate  Speech: pressured, but garbled  Thought processes: disorganized  Hallucinations: uncertain hallucinations  Thought Content: persecutory delusions  Abnormal Thoughts: The patient has no suicidal thoughts and no homicidal thoughts  Orientation: The patient is oriented to person, place and time  Recent and Remote Memory: short term memory intact and long term memory intact  Attention Span And Concentration: concentration impaired  Insight: Limited insight  Judgment: Her judgment was limited  Muscle Strength And Tone  Muscle strength and tone were normal  Normal gait and station  The patient is experiencing no localized pain  Treatment Recommendations: continue with same medications  Risks, Benefits And Possible Side Effects Of Medications: Risks, benefits, and possible side effects of medications explained to patient and patient verbalizes understanding  She reports normal appetite, normal energy level, no weight change and normal number of sleep hours  seen for schizoaffective disorder  She was accompanied by her   She has had episodes of lightheadedness and has been evaluated at ED and discharged  Seems to be at her baseline  Assessment    1  Schizoaffective disorder (295 70) (F25 9)    Review of Systems    Constitutional: lightheaded and weak  Cardiovascular: palpitations, but as noted in HPI  Respiratory: no complaints of shortness of breath, no wheezing, no dyspnea on exertion  Gastrointestinal: no complaints of abdominal pain, no constipation, no nausea, no diarrhea, no vomiting  Genitourinary: no complaints of dysuria, no incontinence, no pelvic pain, no urinary frequency  Musculoskeletal: no complaints of arthralgia, no myalgias, no limb pain, no joint stiffness     Integumentary: no complaints of skin rash, no itching, no dry skin  Neurological: no complaints of headache, no confusion, no numbness, no dizziness  Active Problems    1  Adenocarcinoma of breast (174 9) (C50 919)   2  Macrocytosis (289 89) (D75 89)   3  Schizoaffective disorder (295 70) (F25 9)    Allergies    1  No Known Drug Allergies    Current Meds   1  APAP 325 MG Oral Tablet; TAKE 1 TO 2 TABLETS EVERY 6 HOURS AS NEEDED; Therapy: (Recorded:09Aug2017) to Recorded   2  Aspirin EC 81 MG Oral Tablet Delayed Release; Therapy: (Recorded:06Jun2012) to Recorded   3  BuPROPion HCl ER (SR) 150 MG Oral Tablet Extended Release 12 Hour; TAKE 1   TABLET TWICE DAILY; Therapy: 05PUR0951 to (Evaluate:30Jul2017); Last MO:38FAS3682 Ordered   4  Calcium Acetate 667 MG Oral Capsule; Take 1 capsule twice daily; [facility] Recorded   5  ClonazePAM 2 MG Oral Tablet; TAKE 1 TABLET AT BEDTIME (8pm); Therapy: 23RQZ8134 to (Last Rx:30Aug2017) Ordered   6  Divalproex Sodium 125 MG Oral Capsule Delayed Release Sprinkle; Take 2 capsules   PO four times daily; Therapy: 12ENE2886 to (Evaluate:30Jul2017); Last BF:96XRP1462 Ordered   7  Famotidine 20 MG Oral Tablet; Therapy: (Recorded:06Jun2012) to Recorded   8  Haloperidol 2 MG Oral Tablet; Take 1 tablet PO two times daily; Therapy: 14FLD5922 to (Evaluate:30Jul2017); Last SE:05AQU5273 Ordered   9  Midodrine HCl - 10 MG Oral Tablet; TAKE 1 TABLET 3 TIMES DAILY; Therapy: ([facility]) to Recorded   10  Paliperidone ER 9 MG Oral Tablet Extended Release 24 Hour; TAKE 1 TABLET DAILY; Therapy: 91FRC8452 to (Evaluate:05Aug2017)  Requested for: 93UWL0547; Last    Rx:06Jun2017 Ordered   11  Senna Lax 8 6 MG TABS; Therapy: (Recorded:06Jun2012) to Recorded   12  Therems M TABS; TAKE 1 TABLET DAILY; [facility]   13  Vitamin D 2000 UNIT Oral Capsule; TAKE 1 CAPSULE Every morning;     Therapy: (Recorded:45Lkq9099) to Recorded    Family Psych History  Mother    1  No pertinent family history    Social History    · Never A Smoker    End of Encounter Meds    1  BuPROPion HCl ER (SR) 150 MG Oral Tablet Extended Release 12 Hour; TAKE 1   TABLET TWICE DAILY; Therapy: 45JWV6663 to (Evaluate:30Jul2017); Last PQ:53RHO8887 Ordered   2  ClonazePAM 2 MG Oral Tablet; TAKE 1 TABLET AT BEDTIME (8pm); Therapy: 95CIK5131 to (Last Rx:85Uqq0025) Ordered   3  Divalproex Sodium 125 MG Oral Capsule Delayed Release Sprinkle (Depakote   Sprinkles); Take 2 capsules PO four times daily; Therapy: 97KNV8072 to (Evaluate:16Zap4863); Last QK:88ATL0243 Ordered   4  Haloperidol 2 MG Oral Tablet; Take 1 tablet PO two times daily; Therapy: 44HEN3314 to (Evaluate:69Ktw6505); Last HW:64QFL0423 Ordered   5  Paliperidone ER 9 MG Oral Tablet Extended Release 24 Hour (Invega); TAKE 1 TABLET   DAILY; Therapy: 48JIQ5866 to (Evaluate:05Aug2017)  Requested for: 64SQT7890; Last   Rx:06Jun2017 Ordered    6  APAP 325 MG Oral Tablet; TAKE 1 TO 2 TABLETS EVERY 6 HOURS AS NEEDED; Therapy: ([facility]) to Recorded   7  Aspirin EC 81 MG Oral Tablet Delayed Release; Therapy: (Recorded:06Jun2012) to Recorded   8  Calcium Acetate 667 MG Oral Capsule; Take 1 capsule twice daily; Therapy: ([facility]) to Recorded   9  Famotidine 20 MG Oral Tablet; Therapy: (Recorded:06Jun2012) to Recorded   10  Midodrine HCl - 10 MG Oral Tablet; TAKE 1 TABLET 3 TIMES DAILY; Therapy: ([facility]) to Recorded   11  Senna Lax 8 6 MG TABS; Therapy: (Recorded:06Jun2012) to Recorded   12  Therems M TABS; TAKE 1 TABLET DAILY; Therapy: ([facility]) to Recorded   13  Vitamin D 2000 UNIT Oral Capsule; TAKE 1 CAPSULE Every morning;     Therapy: ([facility]) to Recorded    Future Appointments    Date/Time Provider Specialty Site   08/08/2018 02:30 PM Nargis Campos MD Hematology Oncology [facility]     Signatures Electronically signed by : Lupe Martin MD; Nov 14 2017  3:23PM EST                       (Author)

## 2018-01-16 NOTE — PSYCH
Psych Med Mgmt    Appearance: was calm and cooperative and good eye contact  Observed mood: euthymic  Observed mood: affect was blunted   inappropriate  Speech: a normal rate and fluent  Thought processes: tangential  and disorganized  Hallucinations: auditory hallucinations  Thought Content: persecutory delusions   delusions with sexual themes  Abnormal Thoughts: The patient has no suicidal thoughts and no homicidal thoughts  Orientation: The patient is oriented to person, place and time  Recent and Remote Memory: short term memory intact and long term memory intact  Attention Span And Concentration: concentration impaired  Insight: Limited insight  Judgment: Her judgment was limited  Muscle Strength And Tone  Muscle strength and tone were normal  Normal gait and station  Language: no difficulty naming common objects and no difficulty repeating a phrase  Fund of knowledge: Patient displays adequate fund of knowledge regarding vocabulary  The patient is experiencing no localized pain  Treatment Recommendations: continue with same meds  No evidence of TD  RTO in 6 weeks  Risks, Benefits And Possible Side Effects Of Medications: Patient does not verbalize understanding at this time and will require further explanation  She reports normal appetite, normal energy level, no weight change and normal number of sleep hours  seen for Schizoaffective disorder accompanied by her ICM  She seems to be at her baseline  No new symptoms  She does some coloring and enjoys that sleeping and eating well  Assessment    1  Schizoaffective disorder (295 70) (F25 9)    Review of Systems    Constitutional: No fever, no chills, feels well, no tiredness, no recent weight gain or loss  Cardiovascular: no complaints of slow or fast heart rate, no chest pain, no palpitations  Respiratory: no complaints of shortness of breath, no wheezing, no dyspnea on exertion     Gastrointestinal: no complaints of abdominal pain, no constipation, no nausea, no diarrhea, no vomiting  Active Problems    1  Adenocarcinoma of breast (174 9) (C50 919)   2  Macrocytosis (289 89) (D75 89)   3  Schizoaffective disorder (295 70) (F25 9)    Allergies    1  No Known Drug Allergies    Current Meds   1  Ammonium Lactate 12 % External Lotion; Therapy: 03XET8572 to Recorded   2  Aspirin EC 81 MG Oral Tablet Delayed Release; Therapy: (Recorded:06Jun2012) to Recorded   3  BuPROPion HCl ER (SR) 150 MG Oral Tablet Extended Release 12 Hour; TAKE 1   TABLET TWICE DAILY; Therapy: 16FAC5793 to (Evaluate:74Gjg9710); Last HL:70JFC1297 Ordered   4  ClonazePAM 1 MG Oral Tablet; TAKE 1 TABLET AT BEDTIME (8pm); Therapy: 88WEI5480 to (Last RZ:76HKB6978) Ordered   5  Divalproex Sodium 125 MG Oral Capsule Delayed Release Sprinkle; Take 2 capsules   PO four times daily; Therapy: 87OFM1527 to (Evaluate:17Huu8771); Last GA:63CGH9858 Ordered   6  Docusate Sodium 100 MG Oral Tablet; Therapy: (Recorded:11Jun2014) to Recorded   7  Famotidine 20 MG Oral Tablet; Therapy: (Recorded:06Jun2012) to Recorded   8  Haloperidol 2 MG Oral Tablet; Take 1 tablet PO two times daily; Therapy: 68UNW5586 to (Evaluate:14Gwf7407); Last EO:99NOP9899 Ordered   9  Oscal 500/200 D-3 TABS; Therapy: (Recorded:06Jun2012) to Recorded   10  Paliperidone ER 9 MG Oral Tablet Extended Release 24 Hour; TAKE 1 TABLET DAILY; Therapy: 34YVY5117 to (Evaluate:43Bgb3761)  Requested for: 83JGF6892; Last    Rx:06Jun2017 Ordered   11  Senna Lax 8 6 MG Oral Tablet; Therapy: (Recorded:06Jun2012) to Recorded   12  X-Viate 40 % CREA; Therapy: 31DLV8593 to Recorded    Family Psych History  Mother    1  No pertinent family history    Social History    · Never A Smoker    End of Encounter Meds    1  BuPROPion HCl ER (SR) 150 MG Oral Tablet Extended Release 12 Hour; TAKE 1   TABLET TWICE DAILY; Therapy: 32FMI2127 to (Evaluate:41Xjd6196);  Last FP:23KHN0967 Ordered 2  ClonazePAM 1 MG Oral Tablet; TAKE 1 TABLET AT BEDTIME (8pm); Therapy: 72GRH2122 to (Last LQ:47NKK0174) Ordered   3  Divalproex Sodium 125 MG Oral Capsule Delayed Release Sprinkle (Depakote   Sprinkles); Take 2 capsules PO four times daily; Therapy: 77QWP5749 to (Evaluate:82Fzk5248); Last YR:96BQV3124 Ordered   4  Haloperidol 2 MG Oral Tablet; Take 1 tablet PO two times daily; Therapy: 65UWL3625 to (Evaluate:53Isg3537); Last MM:16RWX2345 Ordered   5  Paliperidone ER 9 MG Oral Tablet Extended Release 24 Hour (Invega); TAKE 1 TABLET   DAILY; Therapy: 12UHX2800 to (Evaluate:20Gfr4433)  Requested for: 03EPN5970; Last   Rx:06Jun2017 Ordered    6  Ammonium Lactate 12 % External Lotion; Therapy: 57EKE0723 to Recorded   7  Aspirin EC 81 MG Oral Tablet Delayed Release; Therapy: (Recorded:06Jun2012) to Recorded   8  Docusate Sodium 100 MG Oral Tablet; Therapy: (Recorded:11Jun2014) to Recorded   9  Famotidine 20 MG Oral Tablet; Therapy: (Recorded:06Jun2012) to Recorded   10  Oscal 500/200 D-3 TABS; Therapy: (Recorded:06Jun2012) to Recorded   11  Senna Lax 8 6 MG Oral Tablet; Therapy: (Recorded:06Jun2012) to Recorded   12  X-Viate 40 % CREA;     Therapy: 88HJH4624 to Recorded    Future Appointments    Date/Time Provider Specialty Site   08/09/2017 03:15 PM Val Campos MD Hematology Oncology [facility]     Signatures   Electronically signed by : Paula Schneider MD; Jul 17 2017  4:07PM EST                       (Author)

## 2018-01-17 NOTE — PSYCH
Psych Med Mgmt    Appearance: was calm and cooperative  Observed mood: mood appropriate  Observed mood: affect appropriate  Speech: a normal rate  Thought processes: disorganized  Hallucinations: auditory hallucinations and visual hallucinations  Thought Content: paranoid ideation  Abnormal Thoughts: The patient has no suicidal thoughts and no homicidal thoughts  Orientation: The patient is oriented to person, place and time  Recent and Remote Memory: short term memory intact and long term memory intact  Attention Span And Concentration: concentration impaired  Insight: Limited insight  Judgment: Her judgment was impaired  Treatment Recommendations: discontinue medications as documented in chart; prescribe medications started in hospital to maintain patient  medications include buproprion sr 150 mg BID, 1 mg HS, depakote 250 mg QID, haldol 2 mg BID, and paliperidone er 6 mg daily  follow up in 3 months or PRN  She reports normal appetite, normal energy level, no weight change and normal number of sleep hours  Patient accompanied by her ICM  Patient was hospitalized at [facility] for psychosis on March 13th - April 5th  Many changes were made to patient's medication regimen, and she has been tolerating it well  Patient's ICM reports that patient's psychosis has resolved in frequency, and patient is now at her baseline, which includes paranoid ideation that people are coming into her room to have sex with her  Patient has occasional episodes of tearfulness  Assessment    1  Schizoaffective disorder (295 70) (F25 9)    Plan    1  BuPROPion HCl ER (SR) 150 MG Oral Tablet Extended Release 12 Hour; TAKE 1   TABLET TWICE DAILY   2  ClonazePAM 1 MG Oral Tablet; TAKE 1 TABLET AT BEDTIME (8pm)   3  Divalproex Sodium 125 MG Oral Capsule Delayed Release Sprinkle (Depakote   Sprinkles); Take 2 capsules PO four times daily   4  Haloperidol 2 MG Oral Tablet;  Take 1 tablet PO two times daily   5  Paliperidone ER 6 MG Oral Tablet Extended Release 24 Hour; TAKE 1 TABLET   DAILY    Review of Systems    Constitutional: No fever, no chills, feels well, no tiredness, no recent weight gain or loss  Cardiovascular: no complaints of slow or fast heart rate, no chest pain, no palpitations  Respiratory: no complaints of shortness of breath, no wheezing, no dyspnea on exertion  Gastrointestinal: no complaints of abdominal pain, no constipation, no nausea, no diarrhea, no vomiting  Genitourinary: no complaints of dysuria, no incontinence, no pelvic pain, no urinary frequency  Musculoskeletal: no complaints of arthralgia, no myalgias, no limb pain, no joint stiffness  Integumentary: no complaints of skin rash, no itching, no dry skin  Neurological: no complaints of headache, no confusion, no numbness, no dizziness  Active Problems    1  Adenocarcinoma of breast (174 9) (C50 919)   2  Macrocytosis (289 89) (D75 89)   3  Schizoaffective disorder (295 70) (F25 9)    Allergies    1  No Known Drug Allergies    Current Meds   1  Ammonium Lactate 12 % External Lotion; Therapy: 57XWQ2964 to Recorded   2  Aspirin EC 81 MG Oral Tablet Delayed Release; Therapy: (Recorded:06Jun2012) to Recorded   3  CarBAMazepine  MG Oral Tablet Extended Release 12 Hour; TAKE 1 TABLET   TWICE DAILY  Requested for: 50FKL6708; Last Rx:22Jun2016 Ordered   4  Docusate Sodium 100 MG Oral Tablet; Therapy: (Recorded:11Jun2014) to Recorded   5  Famotidine 20 MG Oral Tablet; Therapy: (Recorded:06Jun2012) to Recorded   6  Oscal 500/200 D-3 TABS; Therapy: (Recorded:06Jun2012) to Recorded   7  Senna Lax 8 6 MG Oral Tablet; Therapy: (Recorded:06Jun2012) to Recorded   8  Trihexyphenidyl HCl - 2 MG Oral Tablet; Take 1 tablet twice daily  Requested for:   61FPJ6487; Last Rx:22Jun2016 Ordered   9  X-Viate 40 % CREA; Therapy: 29VCJ3035 to Recorded    Family Psych History  Mother    1   No pertinent family history    Social History    · Never A Smoker    End of Encounter Meds    1  BuPROPion HCl ER (SR) 150 MG Oral Tablet Extended Release 12 Hour; TAKE 1   TABLET TWICE DAILY; Therapy: 38TYT7632 to (Evaluate:75Sos6225); Last OU:12GJK8032 Ordered   2  CarBAMazepine  MG Oral Tablet Extended Release 12 Hour (TEGretol-XR); TAKE   1 TABLET TWICE DAILY  Requested for: 22OZI5562; Last Rx:2016 Ordered   3  ClonazePAM 1 MG Oral Tablet; TAKE 1 TABLET AT BEDTIME (8pm); Therapy: 89QUQ6721 to (Last OK:63EWJ5780) Ordered   4  Divalproex Sodium 125 MG Oral Capsule Delayed Release Sprinkle (Depakote   Sprinkles); Take 2 capsules PO four times daily; Therapy: 12LKI1675 to (Evaluate:64Uul2209); Last TD:08QFG0543 Ordered   5  Haloperidol 2 MG Oral Tablet; Take 1 tablet PO two times daily; Therapy: 76DQB8809 to (Evaluate:19Urb7299); Last E46AWV5678 Ordered   6  Paliperidone ER 6 MG Oral Tablet Extended Release 24 Hour; TAKE 1 TABLET DAILY; Therapy: 18JJV0090 to (Evaluate:56Mjw8747); Last ZM:34IHZ6270 Ordered   7  Trihexyphenidyl HCl - 2 MG Oral Tablet; Take 1 tablet twice daily  Requested for:   69GOC2142; Last Rx:2016 Ordered    8  Ammonium Lactate 12 % External Lotion; Therapy: 95ZNQ6372 to Recorded   9  Aspirin EC 81 MG Oral Tablet Delayed Release; Therapy: (Recorded:2012) to Recorded   10  Docusate Sodium 100 MG Oral Tablet; Therapy: (Recorded:2014) to Recorded   11  Famotidine 20 MG Oral Tablet; Therapy: (Recorded:2012) to Recorded   12  Oscal 500/200 D-3 TABS; Therapy: (Recorded:2012) to Recorded   13  Senna Lax 8 6 MG Oral Tablet; Therapy: (Recorded:2012) to Recorded   14  X-Viate 40 % CREA;     Therapy: 00JFY0648 to Recorded    Future Appointments    Date/Time Provider Specialty Site   2017 01:00 PM Haleigh Stewart MD Hematology Oncology [facility]     Signatures   Electronically signed by : Chhaya Urban [facility]; May  1 2017  4:43PM EST (Author)    Electronically signed by : Yohan Al MD; May  1 2017  4:52PM EST

## 2018-01-17 NOTE — PSYCH
Psych Med Mgmt    Appearance: adequate hygiene and grooming, restless and fidgety and poor eye contact  Observed mood: apathetic   Observed mood: affect was blunted   Inappropriate laughter  Speech: decreased volume  Thought processes: disorganized   Loose associations  Hallucinations: auditory hallucinations  Thought Content: persecutory delusions   Religiously preoccupied  Abnormal Thoughts: The patient has no suicidal thoughts  Orientation: The patient is oriented to person, place and time  Recent and Remote Memory: short term memory intact and long term memory intact  Attention Span And Concentration: concentration impaired  Insight: Poor insight  Muscle Strength And Tone  Muscle strength and tone were normal  Normal gait and station  Treatment Recommendations: Continue with same medications and return to the office in 6 weeks  Risks, Benefits And Possible Side Effects Of Medications: Risks, benefits, and possible side effects of medications explained to patient and patient verbalizes understanding  She reports normal appetite, normal energy level, no weight change and normal number of sleep hours  The patient was seen for schizoaffective disorder accompanied by her ICM  She appeared to be quite disorganized, talking about people at the group home trying to talk her into becoming Confucianist  She continues to hear voices  Assessment    1  Schizoaffective disorder (295 70) (F25 9)    Plan    1  From  ClonazePAM 1 MG Oral Tablet TAKE 1 TABLET AT BEDTIME (8pm) To   ClonazePAM 2 MG Oral Tablet TAKE 1 TABLET AT BEDTIME (8pm)    Review of Systems    Constitutional: No fever, no chills, feels well, no tiredness, no recent weight gain or loss  Cardiovascular: no complaints of slow or fast heart rate, no chest pain, no palpitations  Respiratory: no complaints of shortness of breath, no wheezing, no dyspnea on exertion     Gastrointestinal: no complaints of abdominal pain, no constipation, no nausea, no diarrhea, no vomiting  Genitourinary: no complaints of dysuria, no incontinence, no pelvic pain, no urinary frequency  Musculoskeletal: no complaints of arthralgia, no myalgias, no limb pain, no joint stiffness  Integumentary: no complaints of skin rash, no itching, no dry skin  Neurological: no complaints of headache, no confusion, no numbness, no dizziness  Active Problems    1  Adenocarcinoma of breast (174 9) (C50 919)   2  Macrocytosis (289 89) (D75 89)   3  Schizoaffective disorder (295 70) (F25 9)    Allergies    1  No Known Drug Allergies    Current Meds   1  APAP 325 MG Oral Tablet; TAKE 1 TO 2 TABLETS EVERY 6 HOURS AS NEEDED; Therapy: (Recorded:09Aug2017) to Recorded   2  Aspirin EC 81 MG Oral Tablet Delayed Release; Therapy: (Recorded:06Jun2012) to Recorded   3  BuPROPion HCl ER (SR) 150 MG Oral Tablet Extended Release 12 Hour; TAKE 1   TABLET TWICE DAILY; Therapy: 89JFJ7091 to (Evaluate:30Jul2017); Last CQ:52EVH0228 Ordered   4  Calcium Acetate 667 MG Oral Capsule; Take 1 capsule twice daily; Therapy: (Jarodrezia Citlalli) to Recorded   5  ClonazePAM 1 MG Oral Tablet; TAKE 1 TABLET AT BEDTIME (8pm); Therapy: 81LAE2680 to (Last CX:19MKS3879) Ordered   6  Divalproex Sodium 125 MG Oral Capsule Delayed Release Sprinkle; Take 2 capsules   PO four times daily; Therapy: 17ICM9263 to (Evaluate:77Ooh4121); Last ALDEN:13JQJ7729 Ordered   7  Famotidine 20 MG Oral Tablet; Therapy: (Recorded:06Jun2012) to Recorded   8  Haloperidol 2 MG Oral Tablet; Take 1 tablet PO two times daily; Therapy: 29GKZ5254 to (Evaluate:21Kmx7834); Last HT:61YKP1342 Ordered   9  Midodrine HCl - 10 MG Oral Tablet; TAKE 1 TABLET 3 TIMES DAILY; Therapy: (Lucrezia Bouquet) to Recorded   10  Paliperidone ER 9 MG Oral Tablet Extended Release 24 Hour; TAKE 1 TABLET DAILY; Therapy: 13YXL1525 to (Evaluate:00Kwm8310)  Requested for: 99EQY1220; Last    Rx:06Jun2017 Ordered   11   Senna Lax 8 6 MG TABS; Therapy: (Recorded:06Jun2012) to Recorded   12  Therems M TABS; TAKE 1 TABLET DAILY; Therapy: (Duane Pippins) to Recorded   13  Vitamin D 2000 UNIT Oral Capsule; TAKE 1 CAPSULE Every morning; Therapy: (Recorded:09Aug2017) to Recorded    Family Psych History  Mother    1  No pertinent family history    Social History    · Never A Smoker    End of Encounter Meds    1  BuPROPion HCl ER (SR) 150 MG Oral Tablet Extended Release 12 Hour; TAKE 1   TABLET TWICE DAILY; Therapy: 39ZLU2742 to (Evaluate:30Jul2017); Last BX:26QAF3058 Ordered   2  ClonazePAM 2 MG Oral Tablet; TAKE 1 TABLET AT BEDTIME (8pm); Therapy: 87FZH9698 to (Last Rx:30Aug2017) Ordered   3  Divalproex Sodium 125 MG Oral Capsule Delayed Release Sprinkle (Depakote   Sprinkles); Take 2 capsules PO four times daily; Therapy: 92UZK9602 to (Evaluate:30Jul2017); Last ZS:39WXQ9488 Ordered   4  Haloperidol 2 MG Oral Tablet; Take 1 tablet PO two times daily; Therapy: 14ZME5944 to (Evaluate:30Jul2017); Last ZB:83PBL2402 Ordered   5  Paliperidone ER 9 MG Oral Tablet Extended Release 24 Hour (Invega); TAKE 1 TABLET   DAILY; Therapy: 17XFG5898 to (Evaluate:05Aug2017)  Requested for: 91SZA4220; Last   Rx:06Jun2017 Ordered    6  APAP 325 MG Oral Tablet; TAKE 1 TO 2 TABLETS EVERY 6 HOURS AS NEEDED; Therapy: (Duane Pippins) to Recorded   7  Aspirin EC 81 MG Oral Tablet Delayed Release; Therapy: (Recorded:06Jun2012) to Recorded   8  Calcium Acetate 667 MG Oral Capsule; Take 1 capsule twice daily; Therapy: (Duane Pippins) to Recorded   9  Famotidine 20 MG Oral Tablet; Therapy: (Recorded:06Jun2012) to Recorded   10  Midodrine HCl - 10 MG Oral Tablet; TAKE 1 TABLET 3 TIMES DAILY; Therapy: (Duane Pippins) to Recorded   11  Senna Lax 8 6 MG TABS; Therapy: (Recorded:06Jun2012) to Recorded   12  Therems M TABS; TAKE 1 TABLET DAILY; Therapy: (Duane Pippins) to Recorded   13   Vitamin D 2000 UNIT Oral Capsule; TAKE 1 CAPSULE Every morning;     Therapy: (Estefania Cure) to Recorded    Future Appointments    Date/Time Provider Specialty Site   08/08/2018 02:30 PM Jessie Campos MD Hematology Oncology [facility]   11/13/2017 03:50 PM Hina Elizabeth MD Psychiatry [address]     Signatures   Electronically signed by : Anthony Castañeda MD; Aug 30 2017  5:25PM EST                       (Author)

## 2018-02-06 ENCOUNTER — TELEPHONE (OUTPATIENT)
Dept: PSYCHIATRY | Facility: CLINIC | Age: 73
End: 2018-02-06

## 2018-02-06 DIAGNOSIS — F25.0 SCHIZOAFFECTIVE DISORDER, BIPOLAR TYPE (HCC): Primary | ICD-10-CM

## 2018-02-06 RX ORDER — CARBAMAZEPINE 200 MG/1
1 TABLET ORAL 2 TIMES DAILY
COMMUNITY
End: 2018-02-06

## 2018-02-06 RX ORDER — BUSPIRONE HYDROCHLORIDE 15 MG/1
1 TABLET ORAL 2 TIMES DAILY
COMMUNITY
End: 2018-02-06 | Stop reason: SDUPTHER

## 2018-02-06 RX ORDER — BUSPIRONE HYDROCHLORIDE 15 MG/1
15 TABLET ORAL 2 TIMES DAILY
Qty: 60 TABLET | Refills: 2 | Status: SHIPPED | OUTPATIENT
Start: 2018-02-06 | End: 2018-03-01 | Stop reason: SDUPTHER

## 2018-02-06 RX ORDER — DIVALPROEX SODIUM 125 MG/1
250 CAPSULE, COATED PELLETS ORAL 4 TIMES DAILY
Qty: 240 CAPSULE | Refills: 2 | Status: SHIPPED | OUTPATIENT
Start: 2018-02-06 | End: 2018-03-01 | Stop reason: SDUPTHER

## 2018-02-06 RX ORDER — TRIHEXYPHENIDYL HYDROCHLORIDE 2 MG/1
1 TABLET ORAL 2 TIMES DAILY
COMMUNITY
End: 2018-03-01 | Stop reason: SDUPTHER

## 2018-02-06 RX ORDER — DIVALPROEX SODIUM 125 MG/1
2 CAPSULE, COATED PELLETS ORAL 4 TIMES DAILY
COMMUNITY
Start: 2017-05-01 | End: 2018-02-06 | Stop reason: SDUPTHER

## 2018-02-06 RX ORDER — PALIPERIDONE 9 MG/1
9 TABLET, EXTENDED RELEASE ORAL EVERY MORNING
Qty: 30 TABLET | Refills: 2 | Status: SHIPPED | OUTPATIENT
Start: 2018-02-06 | End: 2018-03-01 | Stop reason: SDUPTHER

## 2018-02-06 RX ORDER — BUPROPION HYDROCHLORIDE 150 MG/1
1 TABLET, EXTENDED RELEASE ORAL 2 TIMES DAILY
COMMUNITY
Start: 2017-05-01 | End: 2018-02-13 | Stop reason: SDUPTHER

## 2018-02-06 RX ORDER — CLONAZEPAM 2 MG/1
2 TABLET ORAL
Qty: 60 TABLET | Refills: 0 | Status: SHIPPED | OUTPATIENT
Start: 2018-02-06 | End: 2018-03-01 | Stop reason: SDUPTHER

## 2018-02-06 RX ORDER — CLONAZEPAM 2 MG/1
1 TABLET ORAL
COMMUNITY
Start: 2017-05-01 | End: 2018-02-06 | Stop reason: SDUPTHER

## 2018-02-06 RX ORDER — HALOPERIDOL 2 MG/1
2 TABLET ORAL 4 TIMES DAILY
Qty: 60 TABLET | Refills: 2 | Status: SHIPPED | OUTPATIENT
Start: 2018-02-06 | End: 2018-03-01 | Stop reason: SDUPTHER

## 2018-02-09 ENCOUNTER — TELEPHONE (OUTPATIENT)
Dept: PSYCHIATRY | Facility: CLINIC | Age: 73
End: 2018-02-09

## 2018-02-13 DIAGNOSIS — F25.1 SCHIZOAFFECTIVE DISORDER, DEPRESSIVE TYPE (HCC): Primary | ICD-10-CM

## 2018-02-13 RX ORDER — BUPROPION HYDROCHLORIDE 150 MG/1
150 TABLET, EXTENDED RELEASE ORAL 2 TIMES DAILY
Qty: 60 TABLET | Refills: 5 | Status: SHIPPED | OUTPATIENT
Start: 2018-02-13 | End: 2018-03-01 | Stop reason: SDUPTHER

## 2018-02-13 NOTE — TELEPHONE ENCOUNTER
Called [facility] back and told them she is supposed to be taking both  They said you will need to send a new Rx to her Pharmacy for the [address] [place] [address] Avenue  They never received one  They only received an Rx for the Buspirone

## 2018-03-01 ENCOUNTER — OFFICE VISIT (OUTPATIENT)
Dept: PSYCHIATRY | Facility: CLINIC | Age: 73
End: 2018-03-01
Payer: COMMERCIAL

## 2018-03-01 DIAGNOSIS — F25.1 SCHIZOAFFECTIVE DISORDER, DEPRESSIVE TYPE (HCC): ICD-10-CM

## 2018-03-01 DIAGNOSIS — F25.0 SCHIZOAFFECTIVE DISORDER, BIPOLAR TYPE (HCC): Primary | ICD-10-CM

## 2018-03-01 PROCEDURE — 99214 OFFICE O/P EST MOD 30 MIN: CPT | Performed by: PSYCHIATRY & NEUROLOGY

## 2018-03-01 RX ORDER — PALIPERIDONE 9 MG/1
9 TABLET, EXTENDED RELEASE ORAL EVERY MORNING
Qty: 30 TABLET | Refills: 0 | Status: SHIPPED | OUTPATIENT
Start: 2018-03-01 | End: 2018-03-15 | Stop reason: SDUPTHER

## 2018-03-01 RX ORDER — BUPROPION HYDROCHLORIDE 150 MG/1
150 TABLET, EXTENDED RELEASE ORAL 2 TIMES DAILY
Qty: 60 TABLET | Refills: 0
Start: 2018-03-01 | End: 2018-05-03 | Stop reason: SDUPTHER

## 2018-03-01 RX ORDER — TRIHEXYPHENIDYL HYDROCHLORIDE 2 MG/1
2 TABLET ORAL 2 TIMES DAILY
Refills: 0
Start: 2018-03-01 | End: 2018-05-03 | Stop reason: SDUPTHER

## 2018-03-01 RX ORDER — HALOPERIDOL 2 MG/1
2 TABLET ORAL 4 TIMES DAILY
Qty: 60 TABLET | Refills: 0 | Status: SHIPPED | OUTPATIENT
Start: 2018-03-01 | End: 2018-04-03 | Stop reason: SDUPTHER

## 2018-03-01 RX ORDER — BUSPIRONE HYDROCHLORIDE 15 MG/1
15 TABLET ORAL 2 TIMES DAILY
Qty: 60 TABLET | Refills: 0 | Status: SHIPPED | OUTPATIENT
Start: 2018-03-01 | End: 2018-04-20 | Stop reason: SDUPTHER

## 2018-03-01 RX ORDER — CLONAZEPAM 2 MG/1
2 TABLET ORAL
Qty: 60 TABLET | Refills: 0 | Status: SHIPPED | OUTPATIENT
Start: 2018-03-01 | End: 2018-05-03 | Stop reason: SDUPTHER

## 2018-03-01 RX ORDER — DIVALPROEX SODIUM 125 MG/1
250 CAPSULE, COATED PELLETS ORAL 4 TIMES DAILY
Qty: 240 CAPSULE | Refills: 0
Start: 2018-03-01 | End: 2018-08-02 | Stop reason: SDUPTHER

## 2018-03-01 NOTE — PSYCH
Patient ID: Bran Hays is a 74 y o  female  HPI ROS Appetite Changes and Sleep: normal appetite, normal energy level, no weight change and normal number of sleep hours    Review Of Systems:     Mood Normal   Thought Content Disturbing Thoughts, Feelings   General Normal    Gastrointestinal Normal   Neurological Normal        Laboratory Results: No results found for this or any previous visit  Subjective:  Occasional paranoia and ideas of reference-overall content with the way things are    Objective: At baseline with residual signs  Mild generalized tremor    Mental status:  Appearance calm and cooperative , adequate hygiene and grooming and good eye contact    Mood euthymic   Affect affect was broad   Speech a normal rate   Thought Processes disorganized   Hallucinations no hallucinations present    Thought Content paranoid ideation  and ideas of reference    Abnormal Thoughts no suicidal thoughts  and no homicidal thoughts    Orientation A+O x 3   Remote Memory Grossly intact   Attention Span concentration impaired   Intellect Not Formally Assessed   Insight Limited insight   Judgement judgment was limited   Muscle Strength Normal gait    Language no difficulty naming common objects and no difficulty repeating a phrase    Pain none       Assessment/Plan:       Diagnoses and all orders for this visit:    Schizoaffective disorder, bipolar type (HCC)  -     busPIRone (BUSPAR) 15 mg tablet; Take 1 tablet (15 mg total) by mouth 2 (two) times a day  -     clonazePAM (KlonoPIN) 2 mg tablet; Take 1 tablet (2 mg total) by mouth daily at bedtime  -     divalproex sodium (DEPAKOTE SPRINKLE) 125 MG capsule; Take 2 capsules (250 mg total) by mouth 4 (four) times a day  -     haloperidol (HALDOL) 2 mg tablet; Take 1 tablet (2 mg total) by mouth 4 (four) times a day  -     paliperidone (INVEGA) 9 MG 24 hr tablet; Take 1 tablet (9 mg total) by mouth every morning  -     trihexyphenidyl (ARTANE) 2 mg tablet;  Take 1 tablet (2 mg total) by mouth 2 (two) times a day    Schizoaffective disorder, depressive type (HCC)  -     buPROPion (WELLBUTRIN SR) 150 mg 12 hr tablet; Take 1 tablet (150 mg total) by mouth 2 (two) times a day            Treatment Recommendations- Risks Benefits      Risks, Benefits And Possible Side Effects Of Medications: The risks and benefits of medications and proposed treatment plan were discussed with the patient's caregiver/POA/guardian    The individual understands and agrees

## 2018-03-15 DIAGNOSIS — F25.0 SCHIZOAFFECTIVE DISORDER, BIPOLAR TYPE (HCC): ICD-10-CM

## 2018-03-19 RX ORDER — PALIPERIDONE 9 MG/1
TABLET, EXTENDED RELEASE ORAL
Qty: 30 TABLET | Refills: 0 | Status: SHIPPED | OUTPATIENT
Start: 2018-03-19 | End: 2018-05-03 | Stop reason: SDUPTHER

## 2018-03-19 RX ORDER — DIVALPROEX SODIUM 125 MG/1
CAPSULE, COATED PELLETS ORAL
Qty: 240 CAPSULE | Refills: 0 | Status: SHIPPED | OUTPATIENT
Start: 2018-03-19 | End: 2018-05-03 | Stop reason: SDUPTHER

## 2018-03-28 LAB
ALBUMIN SERPL BCP-MCNC: 3.3 G/DL (ref 3.5–5.7)
ALP SERPL-CCNC: 64 IU/L (ref 55–165)
ALT SERPL W P-5'-P-CCNC: 9 IU/L (ref 7–29)
AMPHETAMINES (HISTORICAL): NEGATIVE
ANION GAP SERPL CALCULATED.3IONS-SCNC: 12.5 MM/L
APTT PPP: 23.5 SEC (ref 24.4–37.6)
AST SERPL W P-5'-P-CCNC: 16 U/L (ref 7–26)
BARBITURATES (HISTORICAL): NEGATIVE
BASOPHILS # BLD AUTO: 0 X3/UL (ref 0–0.3)
BASOPHILS # BLD AUTO: 0.2 % (ref 0–2)
BENZODIAZEPINES (HISTORICAL): NEGATIVE
BILIRUB SERPL-MCNC: 0.5 MG/DL (ref 0.3–1)
BILIRUB UR QL STRIP: NEGATIVE
BUN SERPL-MCNC: 18 MG/DL (ref 7–25)
C-REACTIVE PROTEIN (HISTORICAL): 71.7 MG/L
CALCIUM SERPL-MCNC: 9 MG/DL (ref 8.6–10.5)
CANNABINOIDS (HISTORICAL): NEGATIVE
CHLORIDE SERPL-SCNC: 103 MM/L (ref 98–107)
CLARITY UR: CLEAR
CO2 SERPL-SCNC: 27 MM/L (ref 21–31)
COCAINE (HISTORICAL): NEGATIVE
COLOR UR: YELLOW
CREAT SERPL-MCNC: 1.25 MG/DL (ref 0.6–1.2)
DEPRECATED RDW RBC AUTO: 13.2 %
EGFR (HISTORICAL): 42 GFR
EGFR AFRICAN AMERICAN (HISTORICAL): 51 GFR
EOSINOPHIL # BLD AUTO: 0 X3/UL (ref 0–0.5)
EOSINOPHIL NFR BLD AUTO: 0.4 % (ref 0–5)
ERYTHROCYTE SEDIMENTATION RATE (HISTORICAL): 28 MM/HR
GLUCOSE (HISTORICAL): 100 MG/DL (ref 65–99)
GLUCOSE UR STRIP-MCNC: NEGATIVE MG/DL
HCT VFR BLD AUTO: 36 % (ref 37–47)
HGB BLD-MCNC: 12.5 G/DL (ref 12–16)
HGB UR QL STRIP.AUTO: NEGATIVE
INR PPP: 0.97 (ref 0.9–1.5)
KETONES UR STRIP-MCNC: NEGATIVE MG/DL
LEUKOCYTE ESTERASE UR QL STRIP: NEGATIVE
LYMPHOCYTES # BLD AUTO: 0.9 X3/UL (ref 1.2–4.2)
LYMPHOCYTES NFR BLD AUTO: 10.8 % (ref 20.5–51.1)
MAGNESIUM SERPL-MCNC: 2.2 MG/DL (ref 1.9–2.7)
MCH RBC QN AUTO: 34.1 PG (ref 26–34)
MCHC RBC AUTO-ENTMCNC: 34.6 G/DL (ref 31–37)
MCV RBC AUTO: 98.7 FL (ref 81–99)
METHADONE (HISTORICAL): NEGATIVE
MONOCYTES # BLD AUTO: 1.4 X3/UL (ref 0–1)
MONOCYTES NFR BLD AUTO: 15.5 % (ref 1.7–12)
NEUTROPHILS # BLD AUTO: 6.4 X3/UL (ref 1.4–6.5)
NEUTS SEG NFR BLD AUTO: 73.1 % (ref 42.2–75.2)
NITRITE UR QL STRIP: NEGATIVE
OPIATES (HISTORICAL): NEGATIVE
OSMOLALITY, SERUM (HISTORICAL): 278 MOSM (ref 262–291)
OXYCODONE (HISTORICAL): NEGATIVE
PH UR STRIP.AUTO: 7 [PH] (ref 4.5–8)
PHENCYCLIDINE URINE (HISTORICAL): NEGATIVE
PHOSPHATE SERPL-MCNC: 3.4 MG/DL (ref 3–5.5)
PLATELET # BLD AUTO: 234 X3/UL (ref 130–400)
PLEASE NOTE (HISTORICAL): YES
PMV BLD AUTO: 9.9 FL
POTASSIUM SERPL-SCNC: 4.5 MM/L (ref 3.5–5.5)
PROPOXYPHENE (HISTORICAL): NEGATIVE
PROT UR STRIP-MCNC: NEGATIVE MG/DL
PROTHROMBIN TIME (HISTORICAL): 11.1 SEC (ref 10.1–12.9)
RBC # BLD AUTO: 3.65 X6/UL (ref 3.9–5.2)
SODIUM SERPL-SCNC: 138 MM/L (ref 134–143)
SP GR UR STRIP.AUTO: <= 1.005 (ref 1–1.03)
T4 FREE SERPL-MCNC: 0.88 NG/DL (ref 0.6–1.7)
TOTAL PROTEIN (HISTORICAL): 5.9 G/DL (ref 6.4–8.9)
TROPONIN I SERPL-MCNC: < 0.03 NG/ML
TROPONIN I SERPL-MCNC: < 0.03 NG/ML
TSH SERPL DL<=0.05 MIU/L-ACNC: 3.23 UIU/M (ref 0.45–5.33)
UROBILINOGEN UR QL STRIP.AUTO: 0.2 EU/DL (ref 0.2–8)
VIT B12 SERPL-MCNC: 868 PG/ML (ref 180–914)
WBC # BLD AUTO: 8.7 X3/UL (ref 4.8–10.8)

## 2018-03-29 LAB — VALPROIC ACID TOTAL (HISTORICAL): 56.2 MCQ/M (ref 50–125)

## 2018-04-01 LAB
ANION GAP SERPL CALCULATED.3IONS-SCNC: 10.2 MM/L
BUN SERPL-MCNC: 25 MG/DL (ref 7–25)
CALCIUM SERPL-MCNC: 9 MG/DL (ref 8.6–10.5)
CHLORIDE SERPL-SCNC: 100 MM/L (ref 98–107)
CO2 SERPL-SCNC: 30 MM/L (ref 21–31)
CREAT SERPL-MCNC: 1.19 MG/DL (ref 0.6–1.2)
EGFR (HISTORICAL): 44 GFR
EGFR AFRICAN AMERICAN (HISTORICAL): 54 GFR
GLUCOSE (HISTORICAL): 87 MG/DL (ref 65–99)
OSMOLALITY, SERUM (HISTORICAL): 276 MOSM (ref 262–291)
POTASSIUM SERPL-SCNC: 4.2 MM/L (ref 3.5–5.5)
SODIUM SERPL-SCNC: 136 MM/L (ref 134–143)

## 2018-04-02 LAB
BASOPHILS # BLD AUTO: 0 X3/UL (ref 0–0.3)
BASOPHILS # BLD AUTO: 0.3 % (ref 0–2)
DEPRECATED RDW RBC AUTO: 13.1 %
EOSINOPHIL # BLD AUTO: 0.1 X3/UL (ref 0–0.5)
EOSINOPHIL NFR BLD AUTO: 2.7 % (ref 0–5)
HCT VFR BLD AUTO: 35.3 % (ref 37–47)
HGB BLD-MCNC: 12.2 G/DL (ref 12–16)
LYMPHOCYTES # BLD AUTO: 2.3 X3/UL (ref 1.2–4.2)
LYMPHOCYTES NFR BLD AUTO: 42.3 % (ref 20.5–51.1)
MCH RBC QN AUTO: 33.8 PG (ref 26–34)
MCHC RBC AUTO-ENTMCNC: 34.4 G/DL (ref 31–37)
MCV RBC AUTO: 98.2 FL (ref 81–99)
MONOCYTES # BLD AUTO: 1 X3/UL (ref 0–1)
MONOCYTES NFR BLD AUTO: 17.6 % (ref 1.7–12)
NEUTROPHILS # BLD AUTO: 2 X3/UL (ref 1.4–6.5)
NEUTS SEG NFR BLD AUTO: 37.1 % (ref 42.2–75.2)
PLATELET # BLD AUTO: 206 X3/UL (ref 130–400)
PMV BLD AUTO: 9.5 FL
RBC # BLD AUTO: 3.6 X6/UL (ref 3.9–5.2)
WBC # BLD AUTO: 5.4 X3/UL (ref 4.8–10.8)

## 2018-04-03 DIAGNOSIS — F25.0 SCHIZOAFFECTIVE DISORDER, BIPOLAR TYPE (HCC): ICD-10-CM

## 2018-04-03 RX ORDER — HALOPERIDOL 2 MG/1
TABLET ORAL
Qty: 120 TABLET | Refills: 0 | Status: SHIPPED | OUTPATIENT
Start: 2018-04-03 | End: 2018-04-05 | Stop reason: SDUPTHER

## 2018-04-05 ENCOUNTER — TELEPHONE (OUTPATIENT)
Dept: PSYCHIATRY | Facility: CLINIC | Age: 73
End: 2018-04-05

## 2018-04-05 DIAGNOSIS — F25.0 SCHIZOAFFECTIVE DISORDER, BIPOLAR TYPE (HCC): ICD-10-CM

## 2018-04-05 RX ORDER — HALOPERIDOL 2 MG/1
2 TABLET ORAL 4 TIMES DAILY
Qty: 120 TABLET | Refills: 0 | Status: SHIPPED | OUTPATIENT
Start: 2018-04-05 | End: 2018-05-03 | Stop reason: SDUPTHER

## 2018-04-11 LAB
INFLUENZA A (VIRAL ID) (HISTORICAL): NEGATIVE
INFLUENZA B (VIRAL ID) (HISTORICAL): POSITIVE
S PYO AG THROAT QL: NEGATIVE

## 2018-04-13 LAB
ACCESSION NUMBER (HISTORICAL): 101
CLINICAL REPORT (HISTORICAL): NORMAL
COLLECTION DATE (HISTORICAL): NORMAL
CULTURE STATUS (HISTORICAL): NORMAL
GROWTH (HISTORICAL): NO
SPECIMEN SOURCE (HISTORICAL): NORMAL
SPECIMEN TYPE RECEIVED: (HISTORICAL): NORMAL
TEST INFORMATION (HISTORICAL): NORMAL

## 2018-04-20 DIAGNOSIS — F25.0 SCHIZOAFFECTIVE DISORDER, BIPOLAR TYPE (HCC): ICD-10-CM

## 2018-04-23 RX ORDER — BUSPIRONE HYDROCHLORIDE 15 MG/1
TABLET ORAL
Qty: 60 TABLET | Refills: 0 | Status: SHIPPED | OUTPATIENT
Start: 2018-04-23 | End: 2018-05-03 | Stop reason: SDUPTHER

## 2018-04-23 RX ORDER — DIVALPROEX SODIUM 125 MG/1
CAPSULE, COATED PELLETS ORAL
Qty: 240 CAPSULE | Refills: 0 | Status: SHIPPED | OUTPATIENT
Start: 2018-04-23 | End: 2018-05-03 | Stop reason: SDUPTHER

## 2018-05-03 ENCOUNTER — OFFICE VISIT (OUTPATIENT)
Dept: PSYCHIATRY | Facility: CLINIC | Age: 73
End: 2018-05-03
Payer: COMMERCIAL

## 2018-05-03 DIAGNOSIS — F25.1 SCHIZOAFFECTIVE DISORDER, DEPRESSIVE TYPE (HCC): ICD-10-CM

## 2018-05-03 DIAGNOSIS — F25.0 SCHIZOAFFECTIVE DISORDER, BIPOLAR TYPE (HCC): Primary | ICD-10-CM

## 2018-05-03 PROCEDURE — 99213 OFFICE O/P EST LOW 20 MIN: CPT | Performed by: PSYCHIATRY & NEUROLOGY

## 2018-05-03 RX ORDER — BUSPIRONE HYDROCHLORIDE 15 MG/1
15 TABLET ORAL 2 TIMES DAILY
Qty: 60 TABLET | Refills: 5 | Status: SHIPPED | OUTPATIENT
Start: 2018-05-03 | End: 2018-08-02 | Stop reason: SDUPTHER

## 2018-05-03 RX ORDER — BUPROPION HYDROCHLORIDE 150 MG/1
150 TABLET, EXTENDED RELEASE ORAL 2 TIMES DAILY
Qty: 60 TABLET | Refills: 5 | Status: SHIPPED | OUTPATIENT
Start: 2018-05-03 | End: 2018-08-02 | Stop reason: SDUPTHER

## 2018-05-03 RX ORDER — CLONAZEPAM 2 MG/1
2 TABLET ORAL
Qty: 60 TABLET | Refills: 5 | Status: SHIPPED | OUTPATIENT
Start: 2018-05-03 | End: 2018-08-02 | Stop reason: SDUPTHER

## 2018-05-03 RX ORDER — DIVALPROEX SODIUM 125 MG/1
250 CAPSULE, COATED PELLETS ORAL 4 TIMES DAILY
Qty: 120 CAPSULE | Refills: 5 | Status: SHIPPED | OUTPATIENT
Start: 2018-05-03 | End: 2018-08-02

## 2018-05-03 RX ORDER — HALOPERIDOL 2 MG/1
2 TABLET ORAL 4 TIMES DAILY
Qty: 120 TABLET | Refills: 5 | Status: SHIPPED | OUTPATIENT
Start: 2018-05-03 | End: 2018-08-02 | Stop reason: SDUPTHER

## 2018-05-03 RX ORDER — PALIPERIDONE 9 MG/1
9 TABLET, EXTENDED RELEASE ORAL EVERY MORNING
Qty: 30 TABLET | Refills: 5 | Status: SHIPPED | OUTPATIENT
Start: 2018-05-03 | End: 2018-08-02 | Stop reason: SDUPTHER

## 2018-05-03 RX ORDER — TRIHEXYPHENIDYL HYDROCHLORIDE 2 MG/1
2 TABLET ORAL 2 TIMES DAILY
Qty: 60 TABLET | Refills: 5 | Status: SHIPPED | OUTPATIENT
Start: 2018-05-03 | End: 2018-08-02 | Stop reason: SDUPTHER

## 2018-05-03 NOTE — PSYCH
Patient ID: Parmjit Kapoor is a 74 y o  female  HPI ROS Appetite Changes and Sleep: normal appetite, normal energy level, no weight change and normal number of sleep hours    Review Of Systems:     Mood Normal   Thought Content Normal   General No complaints   Gastrointestinal As Noted in HPI   Neurological Normal        Laboratory Results: No results found for this or any previous visit  Subjective:    She has a number of fragmented delusions and perceptual disturbances in the form of someone coming into her room and playing with her genitals ,trying to have sex with her and that she had a "mini-stroke"  According to her ICM, the patient has had a number of falls and has been evaluated that emergency rooms  I do not have any report on these evaluations      Objective:   Continues to have residual and refractory symptoms    Mental status:  Appearance calm and cooperative , adequate hygiene and grooming and poor eye contact    Mood dysphoric   Affect inappropriate and mood-incongruent   Speech monotonous   Thought Processes disorganized   Hallucinations tactile hallucinations   Thought Content persecutory delusions and paranoid ideation    Abnormal Thoughts no suicidal thoughts  and no homicidal thoughts    Orientation A+O x 3   Remote Memory short term memory intact and long term memory intact   Attention Span concentration impaired   Intellect Not Formally Assessed   Insight Poor insight    Judgement judgment was impaired   Muscle Strength Muscle strength and tone were normal and Normal gait    Language no difficulty naming common objects and no difficulty repeating a phrase    Pain none       Assessment/Plan:       Diagnoses and all orders for this visit:    Schizoaffective disorder, bipolar type (HCC)  -     busPIRone (BUSPAR) 15 mg tablet; Take 1 tablet (15 mg total) by mouth 2 (two) times a day  -     clonazePAM (KlonoPIN) 2 mg tablet;  Take 1 tablet (2 mg total) by mouth daily at bedtime  - divalproex sodium (DEPAKOTE SPRINKLE) 125 MG capsule; Take 2 capsules (250 mg total) by mouth 4 (four) times a day  -     haloperidol (HALDOL) 2 mg tablet; Take 1 tablet (2 mg total) by mouth 4 (four) times a day  -     paliperidone (INVEGA) 9 MG 24 hr tablet; Take 1 tablet (9 mg total) by mouth every morning  -     trihexyphenidyl (ARTANE) 2 mg tablet; Take 1 tablet (2 mg total) by mouth 2 (two) times a day    Schizoaffective disorder, depressive type (HCC)  -     buPROPion (WELLBUTRIN SR) 150 mg 12 hr tablet;  Take 1 tablet (150 mg total) by mouth 2 (two) times a day            Treatment Recommendations- Risks Benefits      Risks, Benefits And Possible Side Effects Of Medications:  Risks, benefits, and possible side effects of medications explained to patient and patient verbalizes understanding

## 2018-07-16 DIAGNOSIS — C50.919 MALIGNANT NEOPLASM OF FEMALE BREAST (HCC): ICD-10-CM

## 2018-08-02 ENCOUNTER — OFFICE VISIT (OUTPATIENT)
Dept: PSYCHIATRY | Facility: CLINIC | Age: 73
End: 2018-08-02
Payer: COMMERCIAL

## 2018-08-02 DIAGNOSIS — F25.0 SCHIZOAFFECTIVE DISORDER, BIPOLAR TYPE (HCC): ICD-10-CM

## 2018-08-02 DIAGNOSIS — F25.1 SCHIZOAFFECTIVE DISORDER, DEPRESSIVE TYPE (HCC): ICD-10-CM

## 2018-08-02 PROCEDURE — 99213 OFFICE O/P EST LOW 20 MIN: CPT | Performed by: PSYCHIATRY & NEUROLOGY

## 2018-08-02 RX ORDER — BUPROPION HYDROCHLORIDE 150 MG/1
150 TABLET, EXTENDED RELEASE ORAL 2 TIMES DAILY
Qty: 60 TABLET | Refills: 5 | Status: SHIPPED | OUTPATIENT
Start: 2018-08-02

## 2018-08-02 RX ORDER — BUSPIRONE HYDROCHLORIDE 15 MG/1
15 TABLET ORAL 2 TIMES DAILY
Qty: 60 TABLET | Refills: 5 | Status: SHIPPED | OUTPATIENT
Start: 2018-08-02

## 2018-08-02 RX ORDER — TRIHEXYPHENIDYL HYDROCHLORIDE 2 MG/1
2 TABLET ORAL 2 TIMES DAILY
Qty: 60 TABLET | Refills: 5 | Status: SHIPPED | OUTPATIENT
Start: 2018-08-02

## 2018-08-02 RX ORDER — DIVALPROEX SODIUM 125 MG/1
250 CAPSULE, COATED PELLETS ORAL 4 TIMES DAILY
Qty: 240 CAPSULE | Refills: 5 | Status: SHIPPED | OUTPATIENT
Start: 2018-08-02

## 2018-08-02 RX ORDER — HALOPERIDOL 2 MG/1
2 TABLET ORAL 4 TIMES DAILY
Qty: 120 TABLET | Refills: 5 | Status: SHIPPED | OUTPATIENT
Start: 2018-08-02 | End: 2018-11-09 | Stop reason: DRUGHIGH

## 2018-08-02 RX ORDER — PALIPERIDONE 9 MG/1
9 TABLET, EXTENDED RELEASE ORAL EVERY MORNING
Qty: 30 TABLET | Refills: 5 | Status: SHIPPED | OUTPATIENT
Start: 2018-08-02

## 2018-08-02 RX ORDER — CLONAZEPAM 2 MG/1
2 TABLET ORAL
Qty: 60 TABLET | Refills: 0 | Status: SHIPPED | OUTPATIENT
Start: 2018-08-02 | End: 2018-08-12

## 2018-08-02 NOTE — PSYCH
Patient ID: Brittnee Munoz is a 74 y o  female  HPI ROS Appetite Changes and Sleep: normal appetite, normal energy level, no weight change and normal number of sleep hours    Review Of Systems:     Mood Normal   Thought Content Normal   General Normal    Gastrointestinal Normal   Neurological Normal        Laboratory Results: No results found for this or any previous visit  Subjective:    Recently diagnosed with stage I endometrial cancer and is going to have hysterectomy  She had initially refused it but now she is agreeable  She continues to have residual psychotic symptoms in the form of auditory hallucinations and also fragmented delusions such as somebody performing a sex change operation      Objective:     No evidence of tardive dyskinesia  At baseline  Mental status:  Appearance calm and cooperative  and good eye contact    Mood euthymic   Affect Blunted and inappropriate   Speech Normal rate, Sparse and garbled   Thought Processes disorganized   Hallucinations auditory hallucinations   Thought Content persecutory delusions   Abnormal Thoughts no suicidal thoughts  and no homicidal thoughts    Orientation A+O x 3   Memory function Not tested   Attention Span concentration impaired   Intellect Not Formally Assessed   Insight Limited insight   Judgement judgment was limited   Muscle Strength Muscle strength and tone were normal and Normal gait    Language no difficulty naming common objects and no difficulty repeating a phrase    Pain none       Assessment/Plan:       Diagnoses and all orders for this visit:    Schizoaffective disorder, bipolar type (HCC)  -     trihexyphenidyl (ARTANE) 2 mg tablet; Take 1 tablet (2 mg total) by mouth 2 (two) times a day  -     paliperidone (INVEGA) 9 MG 24 hr tablet; Take 1 tablet (9 mg total) by mouth every morning  -     haloperidol (HALDOL) 2 mg tablet;  Take 1 tablet (2 mg total) by mouth 4 (four) times a day  -     divalproex sodium (DEPAKOTE SPRINKLE) 125 MG capsule; Take 2 capsules (250 mg total) by mouth 4 (four) times a day  -     clonazePAM (KlonoPIN) 2 mg tablet; Take 1 tablet (2 mg total) by mouth daily at bedtime  -     busPIRone (BUSPAR) 15 mg tablet; Take 1 tablet (15 mg total) by mouth 2 (two) times a day    Schizoaffective disorder, depressive type (HCC)  -     buPROPion (WELLBUTRIN SR) 150 mg 12 hr tablet;  Take 1 tablet (150 mg total) by mouth 2 (two) times a day            Treatment Recommendations- Risks Benefits      Risks, Benefits And Possible Side Effects Of Medications:  Risks, benefits, and possible side effects of medications explained to patient and patient verbalizes understanding

## 2018-08-08 ENCOUNTER — OFFICE VISIT (OUTPATIENT)
Dept: HEMATOLOGY ONCOLOGY | Facility: CLINIC | Age: 73
End: 2018-08-08
Payer: COMMERCIAL

## 2018-08-08 VITALS
WEIGHT: 160 LBS | TEMPERATURE: 97.2 F | HEART RATE: 82 BPM | BODY MASS INDEX: 25.11 KG/M2 | DIASTOLIC BLOOD PRESSURE: 66 MMHG | RESPIRATION RATE: 16 BRPM | HEIGHT: 67 IN | SYSTOLIC BLOOD PRESSURE: 118 MMHG | OXYGEN SATURATION: 97 %

## 2018-08-08 DIAGNOSIS — C54.1 ENDOMETRIAL CANCER (HCC): ICD-10-CM

## 2018-08-08 DIAGNOSIS — Z85.3 HISTORY OF BILATERAL BREAST CANCER: Primary | ICD-10-CM

## 2018-08-08 PROCEDURE — 99214 OFFICE O/P EST MOD 30 MIN: CPT | Performed by: INTERNAL MEDICINE

## 2018-08-08 RX ORDER — FAMOTIDINE 20 MG/1
20 TABLET, FILM COATED ORAL
COMMUNITY

## 2018-08-08 RX ORDER — ENALAPRIL MALEATE 5 MG/1
5 TABLET ORAL
COMMUNITY

## 2018-08-08 RX ORDER — HALOPERIDOL 2 MG/1
TABLET ORAL
COMMUNITY
Start: 2013-08-27 | End: 2018-08-08 | Stop reason: SDUPTHER

## 2018-08-08 RX ORDER — MIDODRINE HYDROCHLORIDE 10 MG/1
1 TABLET ORAL 3 TIMES DAILY
COMMUNITY

## 2018-08-08 RX ORDER — SENNA PLUS 8.6 MG/1
TABLET ORAL
COMMUNITY

## 2018-08-08 RX ORDER — ASPIRIN 81 MG/1
81 TABLET ORAL
COMMUNITY

## 2018-08-08 RX ORDER — TRIHEXYPHENIDYL HYDROCHLORIDE 2 MG/1
TABLET ORAL EVERY 12 HOURS
COMMUNITY
Start: 2012-10-03 | End: 2018-08-08 | Stop reason: SDUPTHER

## 2018-08-08 NOTE — PROGRESS NOTES
HPI:    Patient is here with her   Patient had postmenopausal bleeding in May 8, 2018 and workup has shown grade 1 endometrial cancer  Patient has appointment with GYN oncologist at [facility] for treatment for endometrial cancer     In 1997 patient was diagnosed to have hormone receptor positive stage I bilateral breast cancers  Patient had bilateral mastectomies  She took tamoxifen for 5 years  She  has been coming once yearly for checkup  She has minor arthritic symptoms and some tiredness  No hot flashes  For balance she has been using a walker but not all the time          Current Outpatient Prescriptions:     Acetaminophen (APAP) 325 MG tablet, Take 2 tablets by mouth every 6 (six) hours as needed, Disp: , Rfl:     aspirin (ASPIRIN EC ADULT LOW STRENGTH) 81 mg EC tablet, Take 81 mg by mouth, Disp: , Rfl:     buPROPion (WELLBUTRIN SR) 150 mg 12 hr tablet, Take 1 tablet (150 mg total) by mouth 2 (two) times a day, Disp: 60 tablet, Rfl: 5    busPIRone (BUSPAR) 15 mg tablet, Take 1 tablet (15 mg total) by mouth 2 (two) times a day, Disp: 60 tablet, Rfl: 5    Calcium Acetate, Phos Binder, (CALCIUM ACETATE PO), Take by mouth, Disp: , Rfl:     Cholecalciferol 2000 units TABS, Take 1 capsule by mouth, Disp: , Rfl:     clonazePAM (KlonoPIN) 2 mg tablet, Take 1 tablet (2 mg total) by mouth daily at bedtime, Disp: 60 tablet, Rfl: 0    divalproex sodium (DEPAKOTE SPRINKLE) 125 MG capsule, Take 2 capsules (250 mg total) by mouth 4 (four) times a day, Disp: 240 capsule, Rfl: 5    enalapril (VASOTEC) 5 mg tablet, Take 5 mg by mouth, Disp: , Rfl:     famotidine (PEPCID) 20 mg tablet, Take 20 mg by mouth, Disp: , Rfl:     haloperidol (HALDOL) 2 mg tablet, Take 1 tablet (2 mg total) by mouth 4 (four) times a day, Disp: 120 tablet, Rfl: 5    Iron-Vitamins (THEREMS H PO), Take 1 tablet by mouth daily, Disp: , Rfl:     midodrine (PROAMATINE) 10 MG tablet, Take 1 tablet by mouth 3 (three) times a day, Disp: , Rfl:     paliperidone (INVEGA) 9 MG 24 hr tablet, Take 1 tablet (9 mg total) by mouth every morning, Disp: 30 tablet, Rfl: 5    senna (SENNA-LAX) 8 6 MG tablet, Take by mouth, Disp: , Rfl:     trihexyphenidyl (ARTANE) 2 mg tablet, Take 1 tablet (2 mg total) by mouth 2 (two) times a day, Disp: 60 tablet, Rfl: 5    No Known Allergies     No history exists  ROS:  08/08/18 Reviewed 13 systems:  Presently no headaches, seizures,  diplopia, dysphagia, hoarseness, chest pain, palpitations, shortness of breath, cough, hemoptysis, abdominal pain, nausea, vomiting, change in bowel habits, melena, hematuria, fever, chills, bleeding, bone pains, skin rash, weight loss,   weakness, numbness,  Claudication  No frequent infections  Not unusually sensitive to heat or cold  No swelling of the ankles  No swollen glands  Patient is anxious  Other symptoms are in HPI        /66 (BP Location: Left arm, Cuff Size: Adult)   Pulse 82   Temp (!) 97 2 °F (36 2 °C) (Tympanic)   Resp 16   Ht 5' 6 5" (1 689 m)   Wt 72 6 kg (160 lb)   SpO2 97%   BMI 25 44 kg/m²     Physical Exam:  Alert, oriented, not in distress, no icterus, no oral thrush, no palpable neck mass, clear lung fields, regular heart rate, abdomen  soft and non tender, no palpable abdominal mass, no ascites, no edema of ankles, no calf tenderness, no focal neurological deficit, no skin rash, no palpable lymphadenopathy, good arterial pulses, no clubbing  Patient is anxious  Performance status 2  Bilateral mastectomy scars  No lymphedema      IMAGING:      LABS:  Results for orders placed or performed in visit on 04/11/18   RAPID STREP A SCREEN THROAT (HISTORICAL)   Result Value Ref Range    RAPID STREP A SCREEN NEGATIVE    RAPID STREP A SCREEN THROAT (HISTORICAL)   Result Value Ref Range    COLLECTION DATE (HISTORICAL) 04/11/2018 07:47     ACCESSION NUMBER (HISTORICAL) [id number]     Culture Status (Historical) Final     TEST INFORMATION Culture Group A Screen     SPECIMEN SOURCE THROAT     CLINICAL REPORT (HISTORICAL) NEGATIVE FOR GROUP A STREP AT 48 HOURS     SPECIMEN TYPE RECEIVED: (HISTORICAL) specimen received     GROWTH (HISTORICAL) NO    Influenza A+B Antigen (Historical)   Result Value Ref Range    INFLUENZA A (Viral ID) (HISTORICAL) NEGATIVE     INFLUENZA B (VIRAL ID) (HISTORICAL) POSITIVE (AA)      Labs, Imaging, & Other studies:   All pertinent labs and imaging studies were personally reviewed    Hemoglobin 13 5  WBC 4300  Platelets [id number]  Near normal chemistry profile  Albumin 3 3 and creatinine 1  15  Reviewed and discussed with patient  Assessment and plan:    Patient has grade 1 endometrial cancer and that will be managed by GYN oncologist   Patient had bilateral mastectomies in 1997 for bilateral breast cancers and had tamoxifen for 5 years and  1 of the side effect of tamoxifen is increased risk of endometrial cancer and I have explained that to patient and the   This was part of the informed consent  Patient appears to be free of disease from bilateral breast cancers  She decided against genetic testing for breast cancers  This question came up because of bilateral breast cancers and patient's mother had breast cancer  No history of ovarian cancer in the family  Patient plans to come back to our office in 1 year  All discussed in detail  Questions answered  1  History of bilateral breast cancer    - CBC and differential; Future  - Comprehensive metabolic panel; Future    2  Endometrial cancer [facility])        Patient voiced understanding and agreement in the discussion  Counseling / Coordination of Care   Greater than 50% of total time was spent with the patient and / or family counseling and / or coordination of care

## 2018-08-08 NOTE — LETTER
August 8, 2018     Jaxson Barfield MD  [place]    Patient: Esperanza Giang   YOB: 1945   Date of Visit: 8/8/2018       Dear Dr Tiffanie Lopez: Thank you for referring Esperanza Giang to me for evaluation  Below are my notes for this consultation  If you have questions, please do not hesitate to call me  I look forward to following your patient along with you  Sincerely,        Brianne Jalloh MD        CC: MD Brianne Moore MD  8/8/2018  6:06 PM  Sign at close encounter    HPI:    Patient is here with her   Patient had postmenopausal bleeding in May 8, 2018 and workup has shown grade 1 endometrial cancer  Patient has appointment with GYN oncologist at [facility] for treatment for endometrial cancer     In 1997 patient was diagnosed to have hormone receptor positive stage I bilateral breast cancers  Patient had bilateral mastectomies  She took tamoxifen for 5 years  She  has been coming once yearly for checkup  She has minor arthritic symptoms and some tiredness  No hot flashes  For balance she has been using a walker but not all the time          Current Outpatient Prescriptions:     Acetaminophen (APAP) 325 MG tablet, Take 2 tablets by mouth every 6 (six) hours as needed, Disp: , Rfl:     aspirin (ASPIRIN EC ADULT LOW STRENGTH) 81 mg EC tablet, Take 81 mg by mouth, Disp: , Rfl:     buPROPion (WELLBUTRIN SR) 150 mg 12 hr tablet, Take 1 tablet (150 mg total) by mouth 2 (two) times a day, Disp: 60 tablet, Rfl: 5    busPIRone (BUSPAR) 15 mg tablet, Take 1 tablet (15 mg total) by mouth 2 (two) times a day, Disp: 60 tablet, Rfl: 5    Calcium Acetate, Phos Binder, (CALCIUM ACETATE PO), Take by mouth, Disp: , Rfl:     Cholecalciferol 2000 units TABS, Take 1 capsule by mouth, Disp: , Rfl:     clonazePAM (KlonoPIN) 2 mg tablet, Take 1 tablet (2 mg total) by mouth daily at bedtime, Disp: 60 tablet, Rfl: 0    divalproex sodium (DEPAKOTE SPRINKLE) 125 MG capsule, Take 2 capsules (250 mg total) by mouth 4 (four) times a day, Disp: 240 capsule, Rfl: 5    enalapril (VASOTEC) 5 mg tablet, Take 5 mg by mouth, Disp: , Rfl:     famotidine (PEPCID) 20 mg tablet, Take 20 mg by mouth, Disp: , Rfl:     haloperidol (HALDOL) 2 mg tablet, Take 1 tablet (2 mg total) by mouth 4 (four) times a day, Disp: 120 tablet, Rfl: 5    Iron-Vitamins (THEREMS H PO), Take 1 tablet by mouth daily, Disp: , Rfl:     midodrine (PROAMATINE) 10 MG tablet, Take 1 tablet by mouth 3 (three) times a day, Disp: , Rfl:     paliperidone (INVEGA) 9 MG 24 hr tablet, Take 1 tablet (9 mg total) by mouth every morning, Disp: 30 tablet, Rfl: 5    senna (SENNA-LAX) 8 6 MG tablet, Take by mouth, Disp: , Rfl:     trihexyphenidyl (ARTANE) 2 mg tablet, Take 1 tablet (2 mg total) by mouth 2 (two) times a day, Disp: 60 tablet, Rfl: 5    No Known Allergies     No history exists  ROS:  08/08/18 Reviewed 13 systems:  Presently no headaches, seizures,  diplopia, dysphagia, hoarseness, chest pain, palpitations, shortness of breath, cough, hemoptysis, abdominal pain, nausea, vomiting, change in bowel habits, melena, hematuria, fever, chills, bleeding, bone pains, skin rash, weight loss,   weakness, numbness,  Claudication  No frequent infections  Not unusually sensitive to heat or cold  No swelling of the ankles  No swollen glands  Patient is anxious   Other symptoms are in HPI        /66 (BP Location: Left arm, Cuff Size: Adult)   Pulse 82   Temp (!) 97 2 °F (36 2 °C) (Tympanic)   Resp 16   Ht 5' 6 5" (1 689 m)   Wt 72 6 kg (160 lb)   SpO2 97%   BMI 25 44 kg/m²      Physical Exam:  Alert, oriented, not in distress, no icterus, no oral thrush, no palpable neck mass, clear lung fields, regular heart rate, abdomen  soft and non tender, no palpable abdominal mass, no ascites, no edema of ankles, no calf tenderness, no focal neurological deficit, no skin rash, no palpable lymphadenopathy, good arterial pulses, no clubbing  Patient is anxious  Performance status 2  Bilateral mastectomy scars  No lymphedema  IMAGING:      LABS:  Results for orders placed or performed in visit on 04/11/18   RAPID STREP A SCREEN THROAT (HISTORICAL)   Result Value Ref Range    RAPID STREP A SCREEN NEGATIVE    RAPID STREP A SCREEN THROAT (HISTORICAL)   Result Value Ref Range    COLLECTION DATE (HISTORICAL) 04/11/2018 07:47     ACCESSION NUMBER (HISTORICAL) [id number]     Culture Status (Historical) Final     TEST INFORMATION Culture Group A Screen     SPECIMEN SOURCE THROAT     CLINICAL REPORT (HISTORICAL) NEGATIVE FOR GROUP A STREP AT 48 HOURS     SPECIMEN TYPE RECEIVED: (HISTORICAL) specimen received     GROWTH (HISTORICAL) NO    Influenza A+B Antigen (Historical)   Result Value Ref Range    INFLUENZA A (Viral ID) (HISTORICAL) NEGATIVE     INFLUENZA B (VIRAL ID) (HISTORICAL) POSITIVE (AA)      Labs, Imaging, & Other studies:   All pertinent labs and imaging studies were personally reviewed    Hemoglobin 13 5  WBC 4300  Platelets [id number]  Near normal chemistry profile  Albumin 3 3 and creatinine 1  15  Reviewed and discussed with patient  Assessment and plan:    Patient has grade 1 endometrial cancer and that will be managed by GYN oncologist   Patient had bilateral mastectomies in 1997 for bilateral breast cancers and had tamoxifen for 5 years and  1 of the side effect of tamoxifen is increased risk of endometrial cancer and I have explained that to patient and the   This was part of the informed consent  Patient appears to be free of disease from bilateral breast cancers  She decided against genetic testing for breast cancers  This question came up because of bilateral breast cancers and patient's mother had breast cancer  No history of ovarian cancer in the family  Patient plans to come back to our office in 1 year  All discussed in detail  Questions answered        1  History of bilateral breast cancer    - CBC and differential; Future  - Comprehensive metabolic panel; Future    2  Endometrial cancer [facility])        Patient voiced understanding and agreement in the discussion  Counseling / Coordination of Care   Greater than 50% of total time was spent with the patient and / or family counseling and / or coordination of care

## 2018-08-12 ENCOUNTER — HOSPITAL ENCOUNTER (EMERGENCY)
Facility: HOSPITAL | Age: 73
Discharge: HOME | End: 2018-08-12
Attending: EMERGENCY MEDICINE | Admitting: EMERGENCY MEDICINE
Payer: COMMERCIAL

## 2018-08-12 ENCOUNTER — APPOINTMENT (EMERGENCY)
Dept: CT IMAGING | Facility: HOSPITAL | Age: 73
End: 2018-08-12
Payer: COMMERCIAL

## 2018-08-12 VITALS
HEART RATE: 75 BPM | RESPIRATION RATE: 18 BRPM | OXYGEN SATURATION: 97 % | TEMPERATURE: 97.5 F | WEIGHT: 165 LBS | BODY MASS INDEX: 26.23 KG/M2 | SYSTOLIC BLOOD PRESSURE: 138 MMHG | DIASTOLIC BLOOD PRESSURE: 68 MMHG

## 2018-08-12 DIAGNOSIS — S09.90XA CLOSED HEAD INJURY, INITIAL ENCOUNTER: ICD-10-CM

## 2018-08-12 DIAGNOSIS — S01.01XA LACERATION OF OCCIPITAL REGION OF SCALP, INITIAL ENCOUNTER: ICD-10-CM
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
## 2018-08-12 LAB
ANION GAP SERPL CALCULATED.3IONS-SCNC: 8 MMOL/L (ref 4–13)
ANISOCYTOSIS BLD QL SMEAR: PRESENT
BASOPHILS # BLD AUTO: 0 THOUSANDS/ΜL (ref 0–0.1)
BASOPHILS NFR BLD AUTO: 0 % (ref 0–1)
BUN SERPL-MCNC: 18 MG/DL (ref 7–25)
CALCIUM SERPL-MCNC: 8.9 MG/DL (ref 8.6–10.5)
CHLORIDE SERPL-SCNC: 106 MMOL/L (ref 98–107)
CO2 SERPL-SCNC: 26 MMOL/L (ref 21–31)
CREAT SERPL-MCNC: 1 MG/DL (ref 0.6–1.2)
EOSINOPHIL # BLD AUTO: 0.1 THOUSAND/ΜL (ref 0–0.61)
EOSINOPHIL NFR BLD AUTO: 2 % (ref 0–6)
ERYTHROCYTE [DISTWIDTH] IN BLOOD BY AUTOMATED COUNT: 14.7 % (ref 11.6–15.1)
GFR SERPL CREATININE-BSD FRML MDRD: 56 ML/MIN/1.73SQ M
GLUCOSE SERPL-MCNC: 90 MG/DL (ref 65–140)
HCT VFR BLD AUTO: 37.6 % (ref 37–47)
HGB BLD-MCNC: 12 G/DL (ref 11.5–15.4)
LYMPHOCYTES # BLD AUTO: 1.8 THOUSANDS/ΜL (ref 0.6–4.47)
LYMPHOCYTES NFR BLD AUTO: 31 % (ref 14–44)
MACROCYTES BLD QL AUTO: PRESENT
MCH RBC QN AUTO: 33.7 PG (ref 26.8–34.3)
MCHC RBC AUTO-ENTMCNC: 32 G/DL (ref 31.4–37.4)
MCV RBC AUTO: 105 FL (ref 82–98)
MONOCYTES # BLD AUTO: 1 THOUSAND/ΜL (ref 0.17–1.22)
MONOCYTES NFR BLD AUTO: 17 % (ref 4–12)
NEUTROPHILS # BLD AUTO: 2.9 THOUSANDS/ΜL (ref 1.85–7.62)
NEUTS SEG NFR BLD AUTO: 50 % (ref 43–75)
NRBC BLD AUTO-RTO: 0 /100 WBCS
OVALOCYTES BLD QL SMEAR: PRESENT
PLATELET # BLD AUTO: 197 THOUSANDS/UL (ref 149–390)
PLATELET BLD QL SMEAR: ADEQUATE
PMV BLD AUTO: 10.2 FL (ref 8.9–12.7)
POTASSIUM SERPL-SCNC: 4.2 MMOL/L (ref 3.5–5.5)
RBC # BLD AUTO: 3.57 MILLION/UL (ref 3.81–5.12)
RBC MORPH BLD: NORMAL
SODIUM SERPL-SCNC: 140 MMOL/L (ref 134–143)
WBC # BLD AUTO: 5.8 THOUSAND/UL (ref 4.31–10.16)

## 2018-08-12 PROCEDURE — 90471 IMMUNIZATION ADMIN: CPT

## 2018-08-12 PROCEDURE — 85025 COMPLETE CBC W/AUTO DIFF WBC: CPT | Performed by: EMERGENCY MEDICINE

## 2018-08-12 PROCEDURE — 36415 COLL VENOUS BLD VENIPUNCTURE: CPT | Performed by: EMERGENCY MEDICINE

## 2018-08-12 PROCEDURE — 72125 CT NECK SPINE W/O DYE: CPT

## 2018-08-12 PROCEDURE — 99285 EMERGENCY DEPT VISIT HI MDM: CPT

## 2018-08-12 PROCEDURE — 70450 CT HEAD/BRAIN W/O DYE: CPT

## 2018-08-12 PROCEDURE — 90715 TDAP VACCINE 7 YRS/> IM: CPT | Performed by: EMERGENCY MEDICINE

## 2018-08-12 PROCEDURE — 80048 BASIC METABOLIC PNL TOTAL CA: CPT | Performed by: EMERGENCY MEDICINE

## 2018-08-12 RX ORDER — SODIUM CHLORIDE 9 MG/ML
125 INJECTION, SOLUTION INTRAVENOUS CONTINUOUS
Status: DISCONTINUED | OUTPATIENT
Start: 2018-08-12 | End: 2018-08-12

## 2018-08-12 RX ORDER — DILTIAZEM HYDROCHLORIDE 5 MG/ML
10 INJECTION INTRAVENOUS ONCE
Status: DISCONTINUED | OUTPATIENT
Start: 2018-08-12 | End: 2018-08-12

## 2018-08-12 RX ADMIN — TETANUS TOXOID, REDUCED DIPHTHERIA TOXOID AND ACELLULAR PERTUSSIS VACCINE, ADSORBED 0.5 ML: 5; 2.5; 8; 8; 2.5 SUSPENSION INTRAMUSCULAR at 18:20

## 2018-08-12 NOTE — DISCHARGE INSTRUCTIONS
PLEASE USE YOUR WALKER AT ALL TIMES WHEN WALKING FOR BALANCE ASSISTANCE  YOUR DOCTOR SHOULD EVALUATE YOUR LACERATION IN 1-2 DAYS  THE 3 STAPLES SHOULD BE REMOVED IN 5-7 DAYS  Head Injury   AMBULATORY CARE:   A head injury  is most often caused by a blow to the head  This may occur from a fall, bicycle injury, sports injury, being struck in the head, or a motor vehicle accident  Signs and symptoms: You may have an open wound, swelling, or bruising on your head  Right after the injury, you may be confused  Symptoms may last anywhere from a few hours to a few weeks  You may have any of the following:  · Mild to moderate headache    · Dizziness or loss of balance    · Nausea or vomiting    · Change in mood, such as feeling restless or irritable    · Trouble thinking, remembering, or concentrating    · Ringing in the ears or neck pain    · Drowsiness or decreased amount of energy    · Trouble sleeping  Call 911 or have someone else call for any of the following:   · You cannot be woken  · You have a seizure  · You stop responding to others or you faint  · You have blurry or double vision  · Your speech becomes slurred or confused  · You have arm or leg weakness, loss of feeling, or new problems with coordination  · Your pupils are larger than usual or one pupil is a different size than the other  · You have blood or clear fluid coming out of your ears or nose  Seek care immediately if:   · You have repeated or forceful vomiting  · You feel confused  · Your headache gets worse or becomes severe  · You or someone caring for you notices that you are harder to wake than usual   Contact your healthcare provider if:   · Your symptoms last longer than 6 weeks after the injury  · You have questions or concerns about your condition or care  Medicines:   · Acetaminophen  decreases pain  Acetaminophen is available without a doctor's order  Ask how much to take and how often to take it  Follow directions  Acetaminophen can cause liver damage if not taken correctly  · Take your medicine as directed  Contact your healthcare provider if you think your medicine is not helping or if you have side effects  Tell him or her if you are allergic to any medicine  Keep a list of the medicines, vitamins, and herbs you take  Include the amounts, and when and why you take them  Bring the list or the pill bottles to follow-up visits  Carry your medicine list with you in case of an emergency  Self-care:   · Rest  or do quiet activities for 24 to 48 hours  Limit your time watching TV, using the computer, or doing tasks that require a lot of thinking  Slowly return to your normal activities as directed  Do not play sports or do activities that may cause you to get hit in the head  Ask your healthcare provider when you can return to sports  · Apply ice  on your head for 15 to 20 minutes every hour or as directed  Use an ice pack, or put crushed ice in a plastic bag  Cover it with a towel before you apply it to your skin  Ice helps prevent tissue damage and decreases swelling and pain  · Have someone stay with you for 24 hours  or as directed  This person can monitor you for complications and call [phone]  When you are awake the person should ask you a few questions to see if you are thinking clearly  An example would be to ask your name or your address  Prevent another head injury:   · Wear a helmet that fits properly  Do this when you play sports, or ride a bike, scooter, or skateboard  Helmets help decrease your risk of a serious head injury  Talk to your healthcare provider about other ways you can protect yourself if you play sports  · Wear your seat belt every time you are in a car  This helps to decrease your risk for a head injury if you are in a car accident  Follow up with your healthcare provider as directed:  Write down your questions so you remember to ask them during your visits     © 2017 [facility] Information is for End User's use only and may not be sold, redistributed or otherwise used for commercial purposes  All illustrations and images included in CareNotes® are the copyrighted property of [employer]  or Vik Pandya  The above information is an  only  It is not intended as medical advice for individual conditions or treatments  Talk to your doctor, nurse or pharmacist before following any medical regimen to see if it is safe and effective for you  Staple Care   WHAT YOU NEED TO KNOW:   Staples are often used to close a wound  Your staples may be placed for 3 to 14 days, depending on the location of your wound  DISCHARGE INSTRUCTIONS:   Care for your wound:   · Clean:      ¨ You may be able to shower in 24 hours  Do not soak your wound under water  ¨ Gently wash your wound with soap and warm water daily  Lightly pat it dry  Do not cover your wound unless your healthcare provider tells you to  ¨ You may also need to clean your wound with a mixture of hydrogen peroxide and water  Ask how to do this  ¨ Do not apply ointment or cream to the wound unless your healthcare provider tells you to  · Elevate:      ¨ Rest any arm or leg that has a wound on pillows above the level of your heart  Do this as often as possible for 2 days  This will help decrease swelling and pain, and help you heal faster  · Minimize scarring:      ¨ Avoid sunshine on your wound to reduce scarring  Follow up with your healthcare provider as directed: You may need to return for a wound checkup 3 days after your staples are placed  Ask when you should return to get your staples removed  Staple removal:   · A medical staple remover  will be used to take out your staples  Your healthcare provider will slide the tool under each staple, squeeze the handle, and gently pull the staple out       · Medical tape  will be placed on your wound once your staples are removed  This will help keep your wound closed  The medical tape will fall off on its own after several days  Contact your healthcare provider if:   · You have redness, pain, swelling, and pus draining from your wound  · Your pain medicine does not relieve your pain  · You have a fever of 101°F (38 5°C) or higher  · You have an odor coming from your wound  · You have questions or concerns about your condition or care  Return to the emergency department if:   · Your wound reopens  · You have red streaks in your skin that spread out from your wound  · You have severe pain or vomiting  © 2017 [address] [employer]  Information is for End User's use only and may not be sold, redistributed or otherwise used for commercial purposes  All illustrations and images included in CareNotes® are the copyrighted property of [employer]  or Vik Pandya  The above information is an  only  It is not intended as medical advice for individual conditions or treatments  Talk to your doctor, nurse or pharmacist before following any medical regimen to see if it is safe and effective for you

## 2018-08-12 NOTE — ED PROVIDER NOTES
History  Chief Complaint   Patient presents with    Fall     pt fell while at [facility]  pt didn't have her walker with her at the time of the fall  pt states she became dizzy and fell hitting her head  Resident denies losing consicousness      PATIENT REPORTS THAT WHILE WALKING WITHOUT HER WALKER SHE BECAME DIZZY/LIGHTHEADED AND BEFORE SHE COULD REBOUND HERSELF SHE FELL BACKWARDS, STRIKING HER HEAD  SHE DENIES LOSS OF CONSCIOUSNESS  SHE DENIES NECK PAIN OR RADICULAR PAIN  SHE DENIES PALPITATIONS, CHEST PAIN, SHORTNESS OF BREATH  Prior to Admission Medications   Prescriptions Last Dose Informant Patient Reported? Taking?    Acetaminophen (APAP) 325 MG tablet  Outside Facility (Specify) Yes No   Sig: Take 2 tablets by mouth every 6 (six) hours as needed   Calcium Acetate, Phos Binder, (CALCIUM ACETATE PO)  Outside Facility (Specify) Yes No   Sig: Take by mouth   Cholecalciferol 2000 units TABS  Outside Facility (Specify) Yes No   Sig: Take 1 capsule by mouth   Iron-Vitamins (THEREMS H PO)  Outside Facility (Specify) Yes No   Sig: Take 1 tablet by mouth daily   aspirin (ASPIRIN EC ADULT LOW STRENGTH) 81 mg EC tablet  Outside Facility (Specify) Yes No   Sig: Take 81 mg by mouth   buPROPion (WELLBUTRIN SR) 150 mg 12 hr tablet  Outside Facility (Specify) No No   Sig: Take 1 tablet (150 mg total) by mouth 2 (two) times a day   busPIRone (BUSPAR) 15 mg tablet  Outside Facility (Specify) No No   Sig: Take 1 tablet (15 mg total) by mouth 2 (two) times a day   divalproex sodium (DEPAKOTE SPRINKLE) 125 MG capsule  Outside Facility (Specify) No No   Sig: Take 2 capsules (250 mg total) by mouth 4 (four) times a day   enalapril (VASOTEC) 5 mg tablet  Outside Facility (Specify) Yes No   Sig: Take 5 mg by mouth   famotidine (PEPCID) 20 mg tablet  Outside Facility (Specify) Yes No   Sig: Take 20 mg by mouth   haloperidol (HALDOL) 2 mg tablet  Outside Facility (Specify) No No   Sig: Take 1 tablet (2 mg total) by mouth 4 (four) times a day   midodrine (PROAMATINE) 10 MG tablet  Outside Facility (Specify) Yes No   Sig: Take 1 tablet by mouth 3 (three) times a day   paliperidone (INVEGA) 9 MG 24 hr tablet  Outside Facility (Specify) No No   Sig: Take 1 tablet (9 mg total) by mouth every morning   senna (SENNA-LAX) 8 6 MG tablet  Outside Facility (Specify) Yes No   Sig: Take by mouth   trihexyphenidyl (ARTANE) 2 mg tablet  Outside Facility (Specify) No No   Sig: Take 1 tablet (2 mg total) by mouth 2 (two) times a day      Facility-Administered Medications: None       Past Medical History:   Diagnosis Date    Adenocarcinoma [facility] breast ([facility] [address] )     Fall     Macrocytosis        No past surgical history on file  Family History   Problem Relation Age of Onset    No Known Problems Mother     No Known Problems Father      I have reviewed and agree with the history as documented  Social History   Substance Use Topics    Smoking status: Never Smoker    Smokeless tobacco: Never Used    Alcohol use No        Review of Systems   Constitutional: Negative for chills and fever  HENT: Negative for nosebleeds  Respiratory: Negative for shortness of breath  Cardiovascular: Positive for chest pain  Negative for palpitations  Skin: Positive for wound  SCALP LACERATION   Neurological: Positive for dizziness and headaches  Negative for seizures, syncope and weakness  Physical Exam  Physical Exam   Constitutional: She appears well-developed and well-nourished  ELDERLY BUT PLEASANT 74YEAR-OLD FEMALE, WHO IS IN MILD DISTRESS WITH A LACERATION TO HER POSTERIOR SCALP   HENT:   Head: Normocephalic  Right Ear: External ear normal    Left Ear: External ear normal    Mouth/Throat: Oropharynx is clear and moist    FOR CENTIMETER LINEAR LACERATION IN THE RIGHT OCCIPITAL REGION OF HER SCALP  HEMOSTASIS CONTROLLED  NO HEMOTYMPANUM  NO OROPHARYNGEAL TRAUMA  Eyes: Conjunctivae and EOM are normal    Neck: Neck supple     NO SPINOUS TENDERNESS  STRAIGHTENING OF CERVICAL LORDOSIS   Cardiovascular: Normal rate, regular rhythm and normal heart sounds  Pulmonary/Chest: Effort normal and breath sounds normal    Abdominal: Soft  Musculoskeletal: Normal range of motion  She exhibits no tenderness or deformity  Neurological: She is alert  No cranial nerve deficit  GAIT IS STEADY  Skin: Skin is warm and dry  Capillary refill takes less than 2 seconds  Vital Signs  ED Triage Vitals   Temp Pulse Resp BP SpO2   -- -- -- -- --      Temp src Heart Rate Source Patient Position - Orthostatic VS BP Location FiO2 (%)   -- -- -- -- --      Pain Score       --           There were no vitals filed for this visit  Visual Acuity      ED Medications  Medications - No data to display    Diagnostic Studies  Results Reviewed     None                 No orders to display              Procedures  Lac Repair  Date/Time: 8/12/2018 8:39 PM  Performed by: Tasha Bower  Authorized by: Tasha Bower   Consent: Verbal consent obtained  Consent given by: patient  Patient understanding: patient states understanding of the procedure being performed  Patient identity confirmed: verbally with patient  Body area: head/neck  Location details: scalp  Laceration length: 5 cm  Foreign bodies: no foreign bodies  Tendon involvement: none  Nerve involvement: none  Vascular damage: no  Anesthesia: local infiltration    Anesthesia:  Local Anesthetic: lidocaine 1% with epinephrine  Anesthetic total: 2 mL    Sedation:  Patient sedated: no    Wound Dehiscence:  Superficial Wound Dehiscence: simple closure      Procedure Details:  Preparation: Patient was prepped and draped in the usual sterile fashion    Irrigation solution: saline  Amount of cleaning: standard  Debridement: none  Degree of undermining: none  Skin closure: staples  Number of sutures: 3  Approximation: close  Patient tolerance: Patient tolerated the procedure well with no immediate complications  Comments: THREE STAPLES PLACED AFTER CLEANSING AN INFILTRATION WITH 2 ML OF LIDOCAINE WITHOUT EPI  NO COMPLICATIONS  PROCEDURE TOLERATED WELL  Phone Contacts  ED Phone Contact    ED Course                               MDM  Number of Diagnoses or Management Options  Closed head injury, initial encounter:   Fall, initial encounter:   Laceration of occipital region of scalp, initial encounter:   Diagnosis management comments: ED COURSE:  EXAMINATION AND EVALUATION  AS NOTED PATIENT REPORTS BECOMING LIGHTHEADED AND ON BALANCE, TRIED TO STEADY HERSELF BUT FAILED TO DO SO AND FELL AND STRUCK HER HEAD  NO LOSS OF CONSCIOUS  CT OF THE HEAD AND NECK REPORT AND FILMS WERE REVIEWED  NO ACUTE TRAUMATIC FINDINGS  LABORATORY STUDIES UNREMARKABLE  PATIENT HAD ORTHOSTATICS DONE WHICH INDICATED A DROP IN BLOOD PRESSURE WITH STANDING, HOWEVER SHE IS ON MIDODRINE FOR CHRONIC ORTHOSTASIS  SHE WAS ASYMPTOMATIC  REVIEWED HER ED CARE WITH HER PRIMARY CARE DOCTOR WHO AGREES IT IS APPROPRIATE TO DISCHARGE HER BACK TO THE ASSISTED LIVING FACILITY  Risk of Complications, Morbidity, and/or Mortality  Presenting problems: moderate  Management options: moderate      CritCare Time    Disposition  Final diagnoses:   None     ED Disposition     None      Follow-up Information    None         Patient's Medications   Discharge Prescriptions    No medications on file     No discharge procedures on file      ED Provider  Electronically Signed by           Sheryle Lew, DO  08/12/18 8355

## 2018-08-15 ENCOUNTER — TRANSCRIBE ORDERS (OUTPATIENT)
Dept: ADMINISTRATIVE | Facility: HOSPITAL | Age: 73
End: 2018-08-15

## 2018-08-15 DIAGNOSIS — C54.1 ENDOMETRIAL CA (HCC): Primary | ICD-10-CM

## 2018-08-21 ENCOUNTER — HOSPITAL ENCOUNTER (OUTPATIENT)
Dept: CT IMAGING | Facility: HOSPITAL | Age: 73
Discharge: HOME | End: 2018-08-21
Payer: COMMERCIAL

## 2018-08-21 DIAGNOSIS — C54.1 ENDOMETRIAL CA (HCC): ICD-10-CM

## 2018-08-21 PROCEDURE — 74176 CT ABD & PELVIS W/O CONTRAST: CPT

## 2018-10-11 ENCOUNTER — HOSPITAL ENCOUNTER (EMERGENCY)
Facility: HOSPITAL | Age: 73
Discharge: HOME | End: 2018-10-11
Attending: EMERGENCY MEDICINE | Admitting: EMERGENCY MEDICINE
Payer: COMMERCIAL

## 2018-10-11 ENCOUNTER — APPOINTMENT (EMERGENCY)
Dept: CT IMAGING | Facility: HOSPITAL | Age: 73
End: 2018-10-11
Payer: COMMERCIAL

## 2018-10-11 VITALS
SYSTOLIC BLOOD PRESSURE: 135 MMHG | WEIGHT: 165.34 LBS | HEIGHT: 66 IN | OXYGEN SATURATION: 98 % | BODY MASS INDEX: 26.57 KG/M2 | TEMPERATURE: 96.3 F | RESPIRATION RATE: 16 BRPM | HEART RATE: 75 BPM | DIASTOLIC BLOOD PRESSURE: 73 MMHG
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
## 2018-10-11 DIAGNOSIS — S00.93XA CONTUSION OF HEAD, UNSPECIFIED PART OF HEAD, INITIAL ENCOUNTER: ICD-10-CM

## 2018-10-11 PROCEDURE — 70450 CT HEAD/BRAIN W/O DYE: CPT

## 2018-10-11 PROCEDURE — 99283 EMERGENCY DEPT VISIT LOW MDM: CPT

## 2018-10-11 NOTE — ED PROVIDER NOTES
History  Chief Complaint   Patient presents with    Fall     patient reports walking backwards and slipped  patient states that she hit the left side of her head     66-year-old female with history of falls, and breast CA presents for evaluation status post fall  Patient reports that she was going into her closet and then backed up without her walker and fell backwards hitting the back of her head  Patient reports no additional injury, no back pain, no neck pain, and no injury to the lower extremities  Patient with mild head contusion  Patient reports taking aspirin otherwise no additional blood thinners noted  History provided by:  Patient   used: No        Prior to Admission Medications   Prescriptions Last Dose Informant Patient Reported? Taking?    Acetaminophen (APAP) 325 MG tablet  Outside Facility (Specify) Yes No   Sig: Take 2 tablets by mouth every 6 (six) hours as needed   Calcium Acetate, Phos Binder, (CALCIUM ACETATE PO)  Outside Facility (Specify) Yes No   Sig: Take by mouth   Cholecalciferol 2000 units TABS  Outside Facility (Specify) Yes No   Sig: Take 1 capsule by mouth   Iron-Vitamins (THEREMS H PO)  Outside Facility (Specify) Yes No   Sig: Take 1 tablet by mouth daily   aspirin (ASPIRIN EC ADULT LOW STRENGTH) 81 mg EC tablet  Outside Facility (Specify) Yes No   Sig: Take 81 mg by mouth   buPROPion (WELLBUTRIN SR) 150 mg 12 hr tablet  Outside Facility (Specify) No No   Sig: Take 1 tablet (150 mg total) by mouth 2 (two) times a day   busPIRone (BUSPAR) 15 mg tablet  Outside Facility (Specify) No No   Sig: Take 1 tablet (15 mg total) by mouth 2 (two) times a day   divalproex sodium (DEPAKOTE SPRINKLE) 125 MG capsule  Outside Facility (Specify) No No   Sig: Take 2 capsules (250 mg total) by mouth 4 (four) times a day   enalapril (VASOTEC) 5 mg tablet  Outside Facility (Specify) Yes No   Sig: Take 5 mg by mouth   famotidine (PEPCID) 20 mg tablet  Outside Facility (Specify) Yes No   Sig: Take 20 mg by mouth   haloperidol (HALDOL) 2 mg tablet  Outside Facility (Specify) No No   Sig: Take 1 tablet (2 mg total) by mouth 4 (four) times a day   midodrine (PROAMATINE) 10 MG tablet  Outside Facility (Specify) Yes No   Sig: Take 1 tablet by mouth 3 (three) times a day   paliperidone (INVEGA) 9 MG 24 hr tablet  Outside Facility (Specify) No No   Sig: Take 1 tablet (9 mg total) by mouth every morning   senna (SENNA-LAX) 8 6 MG tablet  Outside Facility (Specify) Yes No   Sig: Take by mouth   trihexyphenidyl (ARTANE) 2 mg tablet  Outside Facility (Specify) No No   Sig: Take 1 tablet (2 mg total) by mouth 2 (two) times a day      Facility-Administered Medications: None       Past Medical History:   Diagnosis Date    [facility] breast ([facility] [address] )     Fall     Macrocytosis     Stroke [facility])        History reviewed  No pertinent surgical history  Family History   Problem Relation Age of Onset    No Known Problems Mother     No Known Problems Father      I have reviewed and agree with the history as documented  Social History   Substance Use Topics    Smoking status: Never Smoker    Smokeless tobacco: Never Used    Alcohol use No        Review of Systems   Constitutional: Negative for chills and fever  HENT: Negative for congestion and rhinorrhea  Eyes: Negative for itching  Respiratory: Negative for choking and shortness of breath  Cardiovascular: Negative for chest pain and palpitations  Gastrointestinal: Negative for abdominal pain, nausea and vomiting  Genitourinary: Negative for dysuria  Musculoskeletal: Negative for back pain and neck pain  Skin: Negative for rash  Neurological: Negative for dizziness and headaches  Psychiatric/Behavioral: Negative for agitation  Physical Exam  Physical Exam   Constitutional: She is oriented to person, place, and time  She appears well-developed and well-nourished  HENT:   Head: Normocephalic and atraumatic  Eyes: Pupils are equal, round, and reactive to light  EOM are normal    Neck: Normal range of motion  Neck supple  Cardiovascular: Normal rate, regular rhythm and normal heart sounds  Pulmonary/Chest: Effort normal and breath sounds normal  No respiratory distress  Abdominal: Soft  Bowel sounds are normal  There is no tenderness  Musculoskeletal: Normal range of motion  She exhibits no edema or deformity  Neurological: She is alert and oriented to person, place, and time  Skin: Skin is warm and dry  Capillary refill takes less than 2 seconds  Nursing note and vitals reviewed  Vital Signs  ED Triage Vitals [10/11/18 1128]   Temperature Pulse Respirations Blood Pressure SpO2   (!) 96 3 °F (35 7 °C) 75 16 135/73 98 %      Temp Source Heart Rate Source Patient Position - Orthostatic VS BP Location FiO2 (%)   Tympanic Monitor Sitting Left arm --      Pain Score       No Pain           Vitals:    10/11/18 1128   BP: 135/73   Pulse: 75   Patient Position - Orthostatic VS: Sitting       Visual Acuity      ED Medications  Medications - No data to display    Diagnostic Studies  Results Reviewed     None                 CT head without contrast   Final Result by Artis Chiu MD (10/11 1212)   Chronic appearing findings as described above without acute abnormalities  Specifically, no evidence of skull fracture or acute intracranial   hemorrhage              Signed by Artis Chiu MD                 Procedures  Procedures       Phone Contacts  ED Phone Contact    ED Course  ED Course as of Oct 11 1315   Thu Oct 11, 2018   1127 Patient seen and examined at bedside  Patient reports hitting back of her head  Otherwise no complaints  CT head ordered  1214 Imaging reviewed, CT head without acute traumatic injury  Patient is ambulatory in the ED with no evidence of lower extremity fracture or pain  1214 Discussed with patient discharge plan and instructions    Patient to follow up with primary care physician as an outpatient  Patient to return to ED if any change or worsening condition: increased pain, new onset fever or bleeding  MDM  CritCare Time    Disposition  Final diagnoses:   Fall, initial encounter   Contusion of head, unspecified part of head, initial encounter     Time reflects when diagnosis was documented in both MDM as applicable and the Disposition within this note     Time User Action Codes Description Comment    10/11/2018 12:15 PM Tj Angelicaia Add [W19  DUHY] Fall, initial encounter     10/11/2018 12:15 PM Elvia Estrella Add [[id number]] Contusion of head, unspecified part of head, initial encounter       ED Disposition     ED Disposition Condition Comment    Discharge  Mary Kate Issa discharge to home/self care  Condition at discharge: Stable        Follow-up Information     Follow up With Specialties Details Why Yamile Tate MD Nephrology, Internal Medicine In 2 days  [address]      [facility] Emergency Medicine  As needed, If symptoms worsen [address] [place] [address]  [phone]          Patient's Medications   Discharge Prescriptions    No medications on file     No discharge procedures on file      ED Provider  Electronically Signed by           Rachelle Harley DO  10/11/18 6828

## 2018-10-11 NOTE — DISCHARGE INSTRUCTIONS

## 2018-11-09 ENCOUNTER — TELEPHONE (OUTPATIENT)
Dept: PSYCHIATRY | Facility: CLINIC | Age: 73
End: 2018-11-09

## 2018-11-09 DIAGNOSIS — F25.0 SCHIZOAFFECTIVE DISORDER, BIPOLAR TYPE (HCC): ICD-10-CM

## 2018-11-09 RX ORDER — HALOPERIDOL 5 MG
2.5 TABLET ORAL 3 TIMES DAILY
Qty: 45 TABLET | Refills: 5 | Status: SHIPPED | OUTPATIENT
Start: 2018-11-09

## 2018-11-19 ENCOUNTER — OFFICE VISIT (OUTPATIENT)
Dept: PSYCHIATRY | Facility: CLINIC | Age: 73
End: 2018-11-19
Payer: COMMERCIAL

## 2018-11-19 DIAGNOSIS — F25.0 SCHIZOAFFECTIVE DISORDER, BIPOLAR TYPE (HCC): Primary | ICD-10-CM

## 2018-11-19 PROCEDURE — 99213 OFFICE O/P EST LOW 20 MIN: CPT | Performed by: PSYCHIATRY & NEUROLOGY

## 2018-11-19 NOTE — PSYCH
Patient ID: Charlotte Mistry is a 74 y o  female  HPI ROS Appetite Changes and Sleep: normal appetite, normal energy level, no weight change and normal number of sleep hours    Review Of Systems:     Mood dysphoric   Thought Content Disturbing Thoughts, Feelings   General Multiple falls   Gastrointestinal none   Neurological Normal        Laboratory Results: No results found for this or any previous visit  Subjective:    Seen for schizoaffective disorder  She was accompanied by her ICM  She is cancer free now and recently celebrated her 73th birthday  Her falling has been attributed to orthostasis  Objective: At baseline with residual symptoms  Mental status:  Appearance calm and cooperative , poor hygiene /disheveled and poor eye contact    Mood dysphoric   Affect inappropriate   Speech Normal rate and Normal volume   Thought Processes disorganized   Hallucinations auditory hallucinations persist    Thought Content persecutory delusions   Abnormal Thoughts no suicidal thoughts  and no homicidal thoughts    Orientation Oriented to Place and person   Memory function Not tested   Attention Span concentration impaired   Intellect Not Formally Assessed   Insight Limited insight   Judgement judgment was limited   Muscle Strength Muscle strength and tone were normal   Language no difficulty naming common objects and no difficulty repeating a phrase    Pain none       Assessment/Plan:       Diagnoses and all orders for this visit:    [facility]ffective disorder, [facility]            Treatment Recommendations- Risks Benefits    RTO in 3 months  Risks, Benefits And Possible Side Effects Of Medications: The risks and benefits of medications and proposed treatment plan were discussed with the patient's caregiver/POA/guardian    The individual understands and agrees

## 2018-12-04 ENCOUNTER — TELEPHONE (OUTPATIENT)
Dept: PSYCHIATRY | Facility: CLINIC | Age: 73
End: 2018-12-04

## 2018-12-05 NOTE — TELEPHONE ENCOUNTER
[address] calling in regards to [place] stating that she is having trouble understanding why she needs to take her meds and trying to refuse them  They said this is very unlike Cr Child stated that she would like to speak with you  I attempted to talk to her but she hung up on me and did not want to talk to anyone else and went back to her room    If she continues with this behavior she will be hospitalized

## 2018-12-09 ENCOUNTER — HOSPITAL ENCOUNTER (EMERGENCY)
Facility: HOSPITAL | Age: 73
Discharge: HOME | End: 2018-12-09
Attending: FAMILY MEDICINE | Admitting: EMERGENCY MEDICINE
Payer: COMMERCIAL

## 2018-12-09 ENCOUNTER — APPOINTMENT (EMERGENCY)
Dept: CT IMAGING | Facility: HOSPITAL | Age: 73
End: 2018-12-09
Payer: COMMERCIAL

## 2018-12-09 VITALS
SYSTOLIC BLOOD PRESSURE: 128 MMHG | HEART RATE: 75 BPM | RESPIRATION RATE: 16 BRPM | OXYGEN SATURATION: 95 % | DIASTOLIC BLOOD PRESSURE: 67 MMHG | TEMPERATURE: 96.5 F

## 2018-12-09 DIAGNOSIS — S06.2X9A LACERATION AND CONTUSION OF CEREBRAL CORTEX (HCC): Primary | ICD-10-CM
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
## 2018-12-09 PROCEDURE — 99285 EMERGENCY DEPT VISIT HI MDM: CPT

## 2018-12-09 PROCEDURE — 70450 CT HEAD/BRAIN W/O DYE: CPT

## 2018-12-09 RX ORDER — GINSENG 100 MG
1 CAPSULE ORAL ONCE
Status: COMPLETED | OUTPATIENT
Start: 2018-12-09 | End: 2018-12-09

## 2018-12-09 RX ADMIN — BACITRACIN ZINC 1 SMALL APPLICATION: 500 OINTMENT TOPICAL at 04:16

## 2018-12-09 NOTE — ED PROVIDER NOTES
History  Chief Complaint   Patient presents with    Fall     fell from bed, head injury-approx 1 inch laceration to left side of scalp, minimal bleeding  denies LOC, denies neck pain       History provided by:  Patient   used: No    Fall   Mechanism of injury: fall    Injury location:  Head/neck  Head/neck injury location:  Head  Incident location:  senior living  Time since incident:  20 minutes  Arrived directly from scene: yes    Fall:     Fall occurred:  From a bed    Impact surface:  Hard floor    Entrapped after fall: no    Suspicion of alcohol use: no    Suspicion of drug use: no    Tetanus status:  Up to date  Prior to arrival data:     Bystander interventions:  None    Patient ambulatory at scene: yes      Blood loss:  Minimal    Responsiveness at scene:  Alert    Orientation at scene:  Person and situation    Loss of consciousness: no      Amnesic to event: no      Airway interventions:  None    Breathing interventions:  None    IV access status:  None    IO access:  None    Fluids administered:  None    Cardiac interventions:  None    Medications administered:  None    Immobilization:  None    Airway condition since incident:  Stable    Breathing condition since incident:  Stable    Circulation condition since incident:  Stable    Mental status condition since incident:  Stable    Disability condition since incident:  Stable  Associated symptoms: no abdominal pain, no back pain, no blindness, no chest pain, no difficulty breathing, no headaches, no hearing loss, no loss of consciousness, no nausea, no neck pain, no seizures and no vomiting    Risk factors: no anticoagulation therapy        Prior to Admission Medications   Prescriptions Last Dose Informant Patient Reported? Taking?    Acetaminophen (APAP) 325 MG tablet  Outside Facility (Specify) Yes No   Sig: Take 2 tablets by mouth every 6 (six) hours as needed   Calcium Acetate, Phos Binder, (CALCIUM ACETATE PO)  Outside Facility (Specify) Yes No   Sig: Take by mouth   Cholecalciferol 2000 units TABS 12/8/2018 at Unknown time Outside Facility ([address] [place] [address]) Yes Yes   Sig: Take 1 capsule by mouth   Iron-Vitamins (THEREMS H PO) Not Taking at Unknown time Outside Facility (Specify) Yes No   Sig: Take 1 tablet by mouth daily   aspirin (ASPIRIN EC ADULT LOW STRENGTH) 81 mg EC tablet 12/8/2018 at Unknown time Outside Facility ([address] [place] [address]) Yes Yes   Sig: Take 81 mg by mouth   buPROPion (WELLBUTRIN SR) 150 mg 12 hr tablet 12/8/2018 at Unknown time Outside Facility (Specify) No Yes   Sig: Take 1 tablet (150 mg total) by mouth 2 (two) times a day   busPIRone (BUSPAR) 15 mg tablet 12/8/2018 at Unknown time Outside Facility (Specify) No Yes   Sig: Take 1 tablet (15 mg total) by mouth 2 (two) times a day   divalproex sodium (DEPAKOTE SPRINKLE) 125 MG capsule 12/8/2018 at Unknown time Outside Facility ([address] [place] [address]) No Yes   Sig: Take 2 capsules (250 mg total) by mouth 4 (four) times a day   enalapril (VASOTEC) 5 mg tablet Not Taking at Unknown time Outside Facility (Specify) Yes No   Sig: Take 5 mg by mouth   famotidine (PEPCID) 20 mg tablet 12/8/2018 at Unknown time Outside Facility ([address] [place] [address]) Yes Yes   Sig: Take 20 mg by mouth   haloperidol (HALDOL) 5 mg tablet 12/8/2018 at Unknown time  No Yes   Sig: Take 0 5 tablets (2 5 mg total) by mouth 3 (three) times a day   midodrine (PROAMATINE) 10 MG tablet 12/8/2018 at Unknown time Outside Facility ([address] [place] [address]) Yes Yes   Sig: Take 1 tablet by mouth 3 (three) times a day   paliperidone (INVEGA) 9 MG 24 hr tablet 12/8/2018 at Unknown time Outside Facility (Specify) No Yes   Sig: Take 1 tablet (9 mg total) by mouth every morning   senna (SENNA-LAX) 8 6 MG tablet 12/8/2018 at Unknown time Outside Facility ([address] [place] [address]) Yes Yes   Sig: Take by mouth   trihexyphenidyl (ARTANE) 2 mg tablet 12/8/2018 at Unknown time Outside Facility (Specify) No Yes   Sig: Take 1 tablet (2 mg total) by mouth 2 (two) times a day      Facility-Administered Medications: None       Past Medical History:   Diagnosis Date    Adenocarcinoma of breast ([address] )     Fall     Macrocytosis     Stroke [facility])        Past Surgical History:   Procedure Laterality Date    MASTECTOMY Bilateral        Family History   Problem Relation Age of Onset    No Known Problems Mother     No Known Problems Father      I have reviewed and agree with the history as documented  Social History   Substance Use Topics    Smoking status: Never Smoker    Smokeless tobacco: Never Used    Alcohol use No        Review of Systems   Constitutional: Negative  HENT: Negative for hearing loss  Eyes: Negative  Negative for blindness  Respiratory: Negative  Cardiovascular: Negative for chest pain  Gastrointestinal: Negative for abdominal pain, nausea and vomiting  Genitourinary: Negative  Musculoskeletal: Negative for back pain and neck pain  Neurological: Negative for seizures, loss of consciousness and headaches  Physical Exam  Physical Exam   Constitutional: She is oriented to person, place, and time  She appears well-developed and well-nourished  HENT:   Head: Normocephalic  Head is with laceration  Cardiovascular: Normal rate, regular rhythm and normal heart sounds  Pulmonary/Chest: Effort normal and breath sounds normal    Neurological: She is alert and oriented to person, place, and time  Nursing note and vitals reviewed        Vital Signs  ED Triage Vitals [12/09/18 0329]   Temperature Pulse Respirations Blood Pressure SpO2   97 5 °F (36 4 °C) 74 18 123/67 98 %      Temp src Heart Rate Source Patient Position - Orthostatic VS BP Location FiO2 (%)   -- -- -- -- --      Pain Score       2           Vitals:    12/09/18 0329   BP: 123/67   Pulse: 74       Visual Acuity      ED Medications  Medications   bacitracin topical ointment 1 small application (1 small application Topical Given 12/9/18 1534)       Diagnostic Studies  Results Reviewed     None CT head without contrast   Final Result by Jacqui Colmenares (12/09 0415)   No acute intracranial findings  Signed by Jacqui Colmenares MD                 Procedures  Lac Repair  Date/Time: 12/9/2018 3:51 AM  Performed by: [employer]  Authorized by: Zabrina    Consent: Verbal consent obtained  Risks and benefits: risks, benefits and alternatives were discussed  Consent given by: patient  Patient understanding: patient states understanding of the procedure being performed  Patient consent: the patient's understanding of the procedure matches consent given  Procedure consent: procedure consent matches procedure scheduled  Relevant documents: relevant documents present and verified  Test results: test results available and properly labeled  Site marked: the operative site was marked  Radiology Images displayed and confirmed  If images not available, report reviewed: imaging studies available  Required items: required blood products, implants, devices, and special equipment available  Patient identity confirmed: verbally with patient  Time out: Immediately prior to procedure a "time out" was called to verify the correct patient, procedure, equipment, support staff and site/side marked as required  Body area: head/neck  Location details: scalp  Laceration length: 1 cm  Foreign bodies: no foreign bodies  Tendon involvement: none  Nerve involvement: none  Vascular damage: no    Anesthesia:  Local Anesthetic: lidocaine 1% without epinephrine    Sedation:  Patient sedated: no    Wound Dehiscence:  Superficial Wound Dehiscence: simple closure      Procedure Details:  Preparation: Patient was prepped and draped in the usual sterile fashion    Irrigation solution: saline  Amount of cleaning: standard  Debridement: none  Degree of undermining: none  Skin closure: staples  Number of sutures: 6  Technique: simple  Approximation: close  Approximation difficulty: simple  Dressing: antibiotic ointment and 4x4 sterile gauze  Patient tolerance: Patient tolerated the procedure well with no immediate complications             Phone Contacts  ED Phone Contact    ED Course                               MDM  CritCare Time    Disposition  Final diagnoses:   Laceration and contusion of cerebral cortex ([facility] [address] )   Fall, initial encounter     Time reflects when diagnosis was documented in both MDM as applicable and the Disposition within this note     Time User Action Codes Description Comment    12/9/2018  3:47 AM Chan Folks Add [S06 2X9A] Laceration and contusion of cerebral cortex ([facility] [address] )     12/9/2018  3:47 AM Chan Folks Add [V54  XXXA] Fall, initial encounter       ED Disposition     None      Follow-up Information     Follow up With Specialties Details Why Aditya Page MD Nephrology, Internal Medicine In 10 days For suture removal [facility]  [phone]            Patient's Medications   Discharge Prescriptions    No medications on file     No discharge procedures on file      ED Provider  Electronically Signed by           Darlene Sweet MD  12/09/18 6596

## 2018-12-09 NOTE — ED NOTES
SPOKE  [address]  SHE IS TRYING TO ARRANGE A CAB TO GET PT BACK TO FACILITY        Diony Hong RN  12/09/18 8515

## 2018-12-09 NOTE — ED NOTES
HAS BEEN RESTING QUIETLY WITH EYE CLOSED  WOKE EASILY  NO NEW C/O  NO CHANGES NOTED        Milton Manrique RN  12/09/18 5325

## 2018-12-09 NOTE — ED NOTES
HAVE CALLED AND LEFT MESSAGES FOR [facility] THAT PT IS BEING D/C  PT AWARE WE ARE WAITING ON TRANSPORT  NO CHANGES NOTED  NO NEEDS EXPRESSED        Benson Zeng RN  12/09/18 1666

## 2018-12-09 NOTE — DISCHARGE INSTRUCTIONS
Laceration   WHAT YOU NEED TO KNOW:   A laceration is an injury to the skin and the soft tissue underneath it  Lacerations happen when you are cut or hit by something  They can happen anywhere on the body  DISCHARGE INSTRUCTIONS:   Return to the emergency department if:   · You have heavy bleeding or bleeding that does not stop after 10 minutes of holding firm, direct pressure over the wound  · Your wound opens up  Contact your healthcare provider if:   · You have a fever or chills  · Your laceration is red, warm, or swollen  · You have red streaks on your skin coming from your wound  · You have white or yellow drainage from the wound that smells bad  · You have pain that gets worse, even after treatment  · You have questions or concerns about your condition or care  Medicines:   · Prescription pain medicine  may be given  Ask how to take this medicine safely  · Antibiotics  help treat or prevent a bacterial infection  · Take your medicine as directed  Contact your healthcare provider if you think your medicine is not helping or if you have side effects  Tell him or her if you are allergic to any medicine  Keep a list of the medicines, vitamins, and herbs you take  Include the amounts, and when and why you take them  Bring the list or the pill bottles to follow-up visits  Carry your medicine list with you in case of an emergency  Care for your wound as directed:   · Do not get your wound wet  until your healthcare provider says it is okay  Do not soak your wound in water  Do not go swimming until your healthcare provider says it is okay  Carefully wash the wound with soap and water  Gently pat the area dry or allow it to air dry  · Change your bandages  when they get wet, dirty, or after washing  Apply new, clean bandages as directed  Do not apply elastic bandages or tape too tight  Do not put powders or lotions over your incision       · Apply antibiotic ointment as directed  Your healthcare provider may give you antibiotic ointment to put over your wound if you have stitches  If you have strips of tape over your incision, let them dry up and fall off on their own  If they do not fall off within 14 days, gently remove them  If you have glue over your wound, do not remove or pick at it  If your glue comes off, do not replace it with glue that you have at home  · Check your wound every day for signs of infection such as swelling, redness, or pus  Self-care:   · Apply ice  on your wound for 15 to 20 minutes every hour or as directed  Use an ice pack, or put crushed ice in a plastic bag  Cover it with a towel  Ice helps prevent tissue damage and decreases swelling and pain  · Use a splint as directed  A splint will decrease movement and stress on your wound  It may help it heal faster  A splint may be used for lacerations over joints or areas of your body that bend  Ask your healthcare provider how to apply and remove a splint  · Decrease scarring of your wound  by applying ointments as directed  Do not apply ointments until your healthcare provider says it is okay  You may need to wait until your wound is healed  Ask which ointment to buy and how often to use it  After your wound is healed, use sunscreen over the area when you are out in the sun  You should do this for at least 6 months to 1 year after your injury  Follow up with your healthcare provider as directed: You may need to follow up in 24 to 48 hours to have your wound checked for infection  You will need to return in 3 to 14 days if you have stitches or staples so they can be removed  Care for your wound as directed to prevent infection and help it heal  Write down your questions so you remember to ask them during your visits  © 2017 [address] [employer] Information is for End User's use only and may not be sold, redistributed or otherwise used for commercial purposes   All illustrations and images included in CareNotes® are the copyrighted property of [employer]  or [employer]  The above information is an  only  It is not intended as medical advice for individual conditions or treatments  Talk to your doctor, nurse or pharmacist before following any medical regimen to see if it is safe and effective for you  Fall Prevention for Older Adults   WHAT YOU NEED TO KNOW:   As you age, your muscles weaken and your risk for falls increases  Your risk also increases if you take medicines that make you sleepy or dizzy  You may also be at risk if you have vision or joint problems, have low blood pressure, or are not active  DISCHARGE INSTRUCTIONS:   Call 911 or have someone else call if:   · You have fallen and are unconscious  · You have fallen and cannot move part of your body  Contact your healthcare provider if:   · You have fallen and have pain or a headache  · You have questions or concerns about your condition or care  Fall prevention tips:   · Stay active  Exercise can help strengthen your muscles and improve your balance  Your healthcare provider may recommend water aerobics, walking, or Deshawn Chi  He may also recommend physical therapy to improve your coordination  Never start an exercise program without asking your healthcare provider first     · Wear shoes that fit well and have soles that   Wear shoes both inside and outside  Use slippers with good   Avoid shoes with high heels  · Use assistive devices as directed  Your healthcare provider may suggest that you use a cane or walker to help you keep your balance  You may need to have grab bars put in your bathroom near the toilet or in the shower  · Stand or sit up slowly  This may help you keep your balance and prevent falls  · Wear a personal alarm  This is a device that allows you to call 911 if you need help  Ask for more information on personal alarms      · Manage your medical conditions  Keep all appointments with your healthcare providers  Visit your eye doctor as directed  Home safety tips:   · Add items to prevent falls in the bathroom  Put nonslip strips on your bath or shower floor to prevent you from slipping  Use a bath mat if you do not have carpet in the bathroom  This will prevent you from falling when you step out of the bath or shower  Use a shower seat so you do not need to stand while you shower  Sit on the toilet or a chair in your bathroom to dry yourself and put on clothing  This will prevent you from losing your balance from drying or dressing yourself while you are standing  · Keep paths clear  Remove books, shoes, and other objects from walkways and stairs  Place cords for telephones and lamps out of the way so that you do not need to walk over them  Tape them down if you cannot move them  Remove small rugs  If you cannot remove a rug, secure it with double-sided tape  This will prevent you from tripping  · Install bright lights in your home  Use night lights to help light paths to the bathroom or kitchen  Always turn on the light before you start walking  · Keep items you use often on shelves within reach  Do not use a step stool to help you reach an item  · Paint or place reflective tape on the edges of your stairs  This will help you see the stairs better  Follow up with your healthcare provider as directed:  Write down your questions so you remember to ask them during your visits  © 2017 [employer] Information is for End User's use only and may not be sold, redistributed or otherwise used for commercial purposes  All illustrations and images included in CareNotes® are the copyrighted property of [employer]  or [employer] Pandya  The above information is an  only  It is not intended as medical advice for individual conditions or treatments   Talk to your doctor, nurse or pharmacist before following any medical regimen to see if it is safe and effective for you

## 2018-12-09 NOTE — ED NOTES
DR CADENA USED STAPLE AND STAPLE  REMOVER TO REPAIR LACERATION        Charlie Robison, RN  12/09/18 9988

## 2018-12-24 ENCOUNTER — APPOINTMENT (EMERGENCY)
Dept: RADIOLOGY | Facility: HOSPITAL | Age: 73
End: 2018-12-24
Payer: COMMERCIAL

## 2018-12-24 ENCOUNTER — APPOINTMENT (EMERGENCY)
Dept: CT IMAGING | Facility: HOSPITAL | Age: 73
End: 2018-12-24
Payer: COMMERCIAL

## 2018-12-24 ENCOUNTER — HOSPITAL ENCOUNTER (EMERGENCY)
Facility: HOSPITAL | Age: 73
Discharge: HOME | End: 2018-12-24
Attending: EMERGENCY MEDICINE
Payer: COMMERCIAL

## 2018-12-24 VITALS
WEIGHT: 151 LBS | BODY MASS INDEX: 24.27 KG/M2 | TEMPERATURE: 98.2 F | RESPIRATION RATE: 18 BRPM | SYSTOLIC BLOOD PRESSURE: 128 MMHG | OXYGEN SATURATION: 100 % | DIASTOLIC BLOOD PRESSURE: 59 MMHG | HEIGHT: 66 IN | HEART RATE: 74 BPM
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
## 2018-12-24 DIAGNOSIS — T07.XXXA MULTIPLE CONTUSIONS: ICD-10-CM

## 2018-12-24 PROCEDURE — 70450 CT HEAD/BRAIN W/O DYE: CPT

## 2018-12-24 PROCEDURE — 73060 X-RAY EXAM OF HUMERUS: CPT

## 2018-12-24 PROCEDURE — 73521 X-RAY EXAM HIPS BI 2 VIEWS: CPT

## 2018-12-24 PROCEDURE — 99285 EMERGENCY DEPT VISIT HI MDM: CPT

## 2018-12-24 RX ORDER — CLONAZEPAM 2 MG/1
2 TABLET ORAL
COMMUNITY

## 2018-12-24 RX ORDER — MIDODRINE HYDROCHLORIDE 10 MG/1
10 TABLET ORAL
COMMUNITY
End: 2018-12-24

## 2018-12-24 RX ORDER — HALOPERIDOL 2 MG/1
2.5 TABLET ORAL
COMMUNITY
End: 2018-12-24
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
## 2018-12-24 NOTE — DISCHARGE INSTRUCTIONS
Fall Prevention for Older Adults   WHAT YOU NEED TO KNOW:   As you age, your muscles weaken and your risk for falls increases  Your risk also increases if you take medicines that make you sleepy or dizzy  You may also be at risk if you have vision or joint problems, have low blood pressure, or are not active  DISCHARGE INSTRUCTIONS:   Call 911 or have someone else call if:   · You have fallen and are unconscious  · You have fallen and cannot move part of your body  Contact your healthcare provider if:   · You have fallen and have pain or a headache  · You have questions or concerns about your condition or care  Fall prevention tips:   · Stay active  Exercise can help strengthen your muscles and improve your balance  Your healthcare provider may recommend water aerobics, walking, or Deshawn Chi  He may also recommend physical therapy to improve your coordination  Never start an exercise program without asking your healthcare provider first     · Wear shoes that fit well and have soles that   Wear shoes both inside and outside  Use slippers with good   Avoid shoes with high heels  · Use assistive devices as directed  Your healthcare provider may suggest that you use a cane or walker to help you keep your balance  You may need to have grab bars put in your bathroom near the toilet or in the shower  · Stand or sit up slowly  This may help you keep your balance and prevent falls  · Wear a personal alarm  This is a device that allows you to call 911 if you need help  Ask for more information on personal alarms  · Manage your medical conditions  Keep all appointments with your healthcare providers  Visit your eye doctor as directed  Home safety tips:   · Add items to prevent falls in the bathroom  Put nonslip strips on your bath or shower floor to prevent you from slipping  Use a bath mat if you do not have carpet in the bathroom   This will prevent you from falling when you step out of the bath or shower  Use a shower seat so you do not need to stand while you shower  Sit on the toilet or a chair in your bathroom to dry yourself and put on clothing  This will prevent you from losing your balance from drying or dressing yourself while you are standing  · Keep paths clear  Remove books, shoes, and other objects from walkways and stairs  Place cords for telephones and lamps out of the way so that you do not need to walk over them  Tape them down if you cannot move them  Remove small rugs  If you cannot remove a rug, secure it with double-sided tape  This will prevent you from tripping  · Install bright lights in your home  Use night lights to help light paths to the bathroom or kitchen  Always turn on the light before you start walking  · Keep items you use often on shelves within reach  Do not use a step stool to help you reach an item  · Paint or place reflective tape on the edges of your stairs  This will help you see the stairs better  Follow up with your healthcare provider as directed:  Write down your questions so you remember to ask them during your visits  © 2017 [address] Information is for End User's use only and may not be sold, redistributed or otherwise used for commercial purposes  All illustrations and images included in CareNotes® are the copyrighted property of A D A M , Inc  or [facility]  The above information is an  only  It is not intended as medical advice for individual conditions or treatments  Talk to your doctor, nurse or pharmacist before following any medical regimen to see if it is safe and effective for you

## 2018-12-28 ENCOUNTER — HOSPITAL ENCOUNTER (EMERGENCY)
Facility: HOSPITAL | Age: 73
Discharge: HOME | End: 2018-12-28
Attending: INTERNAL MEDICINE
Payer: COMMERCIAL

## 2018-12-28 ENCOUNTER — APPOINTMENT (EMERGENCY)
Dept: CT IMAGING | Facility: HOSPITAL | Age: 73
End: 2018-12-28
Payer: COMMERCIAL

## 2018-12-28 VITALS
BODY MASS INDEX: 24.27 KG/M2 | HEART RATE: 81 BPM | DIASTOLIC BLOOD PRESSURE: 69 MMHG | TEMPERATURE: 95.5 F | HEIGHT: 66 IN | RESPIRATION RATE: 14 BRPM | OXYGEN SATURATION: 97 % | WEIGHT: 151 LBS | SYSTOLIC BLOOD PRESSURE: 126 MMHG

## 2018-12-28 DIAGNOSIS — S01.81XA LACERATION OF SKIN OF FACE, INITIAL ENCOUNTER: ICD-10-CM

## 2018-12-28 DIAGNOSIS — S01.91XA LACERATION OF HEAD: Primary | ICD-10-CM

## 2018-12-28 PROCEDURE — 99284 EMERGENCY DEPT VISIT MOD MDM: CPT

## 2018-12-28 PROCEDURE — 70450 CT HEAD/BRAIN W/O DYE: CPT

## 2018-12-28 RX ORDER — LIDOCAINE HYDROCHLORIDE AND EPINEPHRINE 10; 10 MG/ML; UG/ML
1 INJECTION, SOLUTION INFILTRATION; PERINEURAL ONCE
Status: DISCONTINUED | OUTPATIENT
Start: 2018-12-28 | End: 2018-12-28

## 2018-12-28 RX ORDER — LIDOCAINE HYDROCHLORIDE 10 MG/ML
10 INJECTION, SOLUTION EPIDURAL; INFILTRATION; INTRACAUDAL; PERINEURAL ONCE
Status: COMPLETED | OUTPATIENT
Start: 2018-12-28 | End: 2018-12-28

## 2018-12-28 RX ADMIN — LIDOCAINE HYDROCHLORIDE 10 ML: 10 INJECTION, SOLUTION EPIDURAL; INFILTRATION; INTRACAUDAL; PERINEURAL at 03:15

## 2018-12-28 NOTE — DISCHARGE INSTRUCTIONS
Laceration   WHAT YOU NEED TO KNOW:   A laceration is an injury to the skin and the soft tissue underneath it  Lacerations happen when you are cut or hit by something  They can happen anywhere on the body  DISCHARGE INSTRUCTIONS:   Return to the emergency department if:   · You have heavy bleeding or bleeding that does not stop after 10 minutes of holding firm, direct pressure over the wound  · Your wound opens up  Contact your healthcare provider if:   · You have a fever or chills  · Your laceration is red, warm, or swollen  · You have red streaks on your skin coming from your wound  · You have white or yellow drainage from the wound that smells bad  · You have pain that gets worse, even after treatment  · You have questions or concerns about your condition or care  Medicines:   · Prescription pain medicine  may be given  Ask how to take this medicine safely  · Antibiotics  help treat or prevent a bacterial infection  · Take your medicine as directed  Contact your healthcare provider if you think your medicine is not helping or if you have side effects  Tell him or her if you are allergic to any medicine  Keep a list of the medicines, vitamins, and herbs you take  Include the amounts, and when and why you take them  Bring the list or the pill bottles to follow-up visits  Carry your medicine list with you in case of an emergency  Care for your wound as directed:   · Do not get your wound wet  until your healthcare provider says it is okay  Do not soak your wound in water  Do not go swimming until your healthcare provider says it is okay  Carefully wash the wound with soap and water  Gently pat the area dry or allow it to air dry  · Change your bandages  when they get wet, dirty, or after washing  Apply new, clean bandages as directed  Do not apply elastic bandages or tape too tight  Do not put powders or lotions over your incision  · Apply antibiotic ointment as directed  Your healthcare provider may give you antibiotic ointment to put over your wound if you have stitches  If you have strips of tape over your incision, let them dry up and fall off on their own  If they do not fall off within 14 days, gently remove them  If you have glue over your wound, do not remove or pick at it  If your glue comes off, do not replace it with glue that you have at home  · Check your wound every day for signs of infection such as swelling, redness, or pus  Self-care:   · Apply ice  on your wound for 15 to 20 minutes every hour or as directed  Use an ice pack, or put crushed ice in a plastic bag  Cover it with a towel  Ice helps prevent tissue damage and decreases swelling and pain  · Use a splint as directed  A splint will decrease movement and stress on your wound  It may help it heal faster  A splint may be used for lacerations over joints or areas of your body that bend  Ask your healthcare provider how to apply and remove a splint  · Decrease scarring of your wound  by applying ointments as directed  Do not apply ointments until your healthcare provider says it is okay  You may need to wait until your wound is healed  Ask which ointment to buy and how often to use it  After your wound is healed, use sunscreen over the area when you are out in the sun  You should do this for at least 6 months to 1 year after your injury  Follow up with your healthcare provider as directed: You may need to follow up in 24 to 48 hours to have your wound checked for infection  You will need to return in 3 to 14 days if you have stitches or staples so they can be removed  Care for your wound as directed to prevent infection and help it heal  Write down your questions so you remember to ask them during your visits  © 2017 [facility] Information is for End User's use only and may not be sold, redistributed or otherwise used for commercial purposes   All illustrations and images included in [facility] are the copyrighted property of A D A M , Inc  or [employer] Pandya  The above information is an  only  It is not intended as medical advice for individual conditions or treatments  Talk to your doctor, nurse or pharmacist before following any medical regimen to see if it is safe and effective for you  Type suture check in 3 days  And suture removal in 8-10 days

## 2018-12-28 NOTE — ED NOTES
Patient Presents to ED via EMS, claims that she went to the bathroom and fell getting back into bed  Patient has a 4cm laceration to the bone above her right eye brow  No LOC  Wound is dressed with tape and an ABD pad  Patient denies pain  Wound was cleansed with Sure Cleanse and dressed with stacked 4x4 gauze and secured with 3" coban  Coretha Crape informed of evaluation        Maximiliano Mann RN  12/28/18 Aidan Gage, KIRK  12/28/18 027

## 2018-12-28 NOTE — ED PROVIDER NOTES
History  Chief Complaint   Patient presents with   [facility] Laceration     Dorothy Carver, sustained deep, 4cm laceration to right forehead  unknown LOC     77-YEAR-OLD FEMALE BROUGHT IN BY EMS  Patient lives at [facility] manner and was getting into bed fell hit her head has a frontal laceration  She denies loss of consciousness, she has no headache dizziness lightheadedness or blurred vision  She has no neck pain and she is moving all extremities equally  She has a large open laceration on her forehead  Patient is on aspirin therapy  PATIENT HAS A 2ND LACERATION about 2 2 cm under nose extending almost to her lip  Pts laceration on her forehead is about 4 1 cm in length  The laceration is linear and deep  Prior to Admission Medications   Prescriptions Last Dose Informant Patient Reported? Taking?    Acetaminophen (APAP) 325 MG tablet  Outside Facility (Specify) Yes No   Sig: Take 2 tablets by mouth every 6 (six) hours as needed   Calcium Acetate, Phos Binder, (CALCIUM ACETATE PO)  Outside Facility (Specify) Yes No   Sig: Take by mouth   Cholecalciferol 2000 units TABS  Outside Facility (Specify) Yes No   Sig: Take 1 capsule by mouth   Iron-Vitamins (THEREMS H PO)  Outside Facility (Specify) Yes No   Sig: Take 1 tablet by mouth daily   aspirin (ASPIRIN EC ADULT LOW STRENGTH) 81 mg EC tablet  Outside Facility (Specify) Yes No   Sig: Take 81 mg by mouth   buPROPion (WELLBUTRIN SR) 150 mg 12 hr tablet  Outside Facility (Specify) No No   Sig: Take 1 tablet (150 mg total) by mouth 2 (two) times a day   busPIRone (BUSPAR) 15 mg tablet  Outside Facility (Specify) No No   Sig: Take 1 tablet (15 mg total) by mouth 2 (two) times a day   calcium acetate (PHOSLO) 667 mg capsule   Yes No   Sig: Take 667 mg by mouth   clonazePAM (KlonoPIN) 2 mg tablet   Yes No   Sig: Take 2 mg by mouth   divalproex sodium (DEPAKOTE SPRINKLE) 125 MG capsule  Outside Facility (Specify) No No   Sig: Take 2 capsules (250 mg total) by mouth 4 (four) times a day   enalapril (VASOTEC) 5 mg tablet  Outside Facility (Specify) Yes No   Sig: Take 5 mg by mouth   famotidine (PEPCID) 20 mg tablet  Outside Facility (Specify) Yes No   Sig: Take 20 mg by mouth   haloperidol (HALDOL) 5 mg tablet   No No   Sig: Take 0 5 tablets (2 5 mg total) by mouth 3 (three) times a day   midodrine (PROAMATINE) 10 MG tablet  Outside Facility (Specify) Yes No   Sig: Take 1 tablet by mouth 3 (three) times a day   paliperidone (INVEGA) 9 MG 24 hr tablet  Outside Facility (Specify) No No   Sig: Take 1 tablet (9 mg total) by mouth every morning   senna (SENNA-LAX) 8 6 MG tablet  Outside Facility (Specify) Yes No   Sig: Take by mouth   trihexyphenidyl (ARTANE) 2 mg tablet  Outside Facility (Specify) No No   Sig: Take 1 tablet (2 mg total) by mouth 2 (two) times a day      Facility-Administered Medications: None       Past Medical History:   Diagnosis Date    Adenocarcinoma of breast ([facility] [address] )     Fall     Macrocytosis     Stroke [facility])        Past Surgical History:   Procedure Laterality Date    MASTECTOMY Bilateral        Family History   Problem Relation Age of Onset    No Known Problems Mother     No Known Problems Father      I have reviewed and agree with the history as documented  Social History   Substance Use Topics    Smoking status: Never Smoker    Smokeless tobacco: Never Used    Alcohol use No        Review of Systems   Constitutional: Negative  HENT: Negative  Eyes: Negative  Respiratory: Negative  Cardiovascular: Negative  Gastrointestinal: Negative  Endocrine: Negative  Genitourinary: Negative  Skin: Positive for wound  Patient with laceration on forehead about 5 cm in length deep gash  The gash is linear  Neurological: Negative  Hematological: Negative  Psychiatric/Behavioral: Negative  Physical Exam  Physical Exam   Constitutional: She appears well-developed and well-nourished     HENT:   Patient has 2 lacerations on her face 1 on her forehead which measures 4 1 cm in length  The laceration is linear  She also has a laceration on her left nasal labial fold and it measures about 2 1-2 2 cm and also linear extending almost the patient's upper lip  She also has a partial tear of her frenulum on the inside of her mouth  Eyes: Pupils are equal, round, and reactive to light  EOM are normal    Neck: Normal range of motion  Neck supple  Cardiovascular: Normal rate and regular rhythm  Pulmonary/Chest: Effort normal and breath sounds normal    Abdominal: Soft  Bowel sounds are normal    Nursing note and vitals reviewed  Vital Signs  ED Triage Vitals [12/28/18 0213]   Temperature Pulse Respirations Blood Pressure SpO2   (!) 95 5 °F (35 3 °C) 81 14 118/61 99 %      Temp Source Heart Rate Source Patient Position - Orthostatic VS BP Location FiO2 (%)   Tympanic Monitor Lying Left arm --      Pain Score       No Pain           Vitals:    12/28/18 0213 12/28/18 0339 12/28/18 0421   BP: 118/61 132/62 141/63   Pulse: 81 77 77   Patient Position - Orthostatic VS: Lying Lying Lying       Visual Acuity      ED Medications  Medications   lidocaine (PF) (XYLOCAINE-MPF) 1 % injection 10 mL (10 mL Infiltration Given 12/28/18 0315)       Diagnostic Studies  Results Reviewed     None                 CT head without contrast   ED Interpretation by Brisa Truong MD (12/28 0340)   No acute intracranial hemorrhage  Right frontal scalp laceration without   underlying acute fracture  Signed by Shira Washington      Final Result by Shira Washington MD (12/28 9852)   No acute intracranial hemorrhage  Right frontal scalp laceration without   underlying acute fracture  Signed by Shira Washington                 Procedures  Lac Repair  Date/Time: 12/28/2018 3:44 AM  Performed by: Chula Mo  Authorized by: Chula Mo   Consent: Verbal consent obtained    Risks and benefits: risks, benefits and alternatives were discussed  Consent given by: patient  Patient understanding: patient states understanding of the procedure being performed  Patient consent: the patient's understanding of the procedure matches consent given  Site marked: the operative site was marked  Radiology Images displayed and confirmed  If images not available, report reviewed: imaging studies not available  Patient identity confirmed: verbally with patient  Body area: head/neck  Location details: upper lip  Laceration length: 4 1 cm  Foreign bodies: no foreign bodies  Tendon involvement: none  Nerve involvement: none    Anesthesia:  Local Anesthetic: lidocaine 1% without epinephrine    Sedation:  Patient sedated: no    Wound Dehiscence:  Superficial Wound Dehiscence: simple closure      Procedure Details:  Irrigation solution: saline  Irrigation method: syringe  Amount of cleaning: standard  Debridement: none  Degree of undermining: none  Skin closure: 4-0 nylon  Number of sutures: 7  Technique: simple  Approximation: close  Approximation difficulty: simple  Dressing: 4x4 sterile gauze, pressure dressing and antibiotic ointment  Patient tolerance: Patient tolerated the procedure well with no immediate complications  Comments: Additional laceration which extended from the patient's will nasolabial fold to her upper lip  The laceration was 2 1 cm in length and linear  Require simple closure  Patient was placed in the prone position draped the wound was 1% lidocaine was perfused for cc of 1% lidocaine patient tolerated the procedure well   Four sutures were placed                Phone Contacts  ED Phone Contact    ED Course                               MDM  CritCare Time    Disposition  Final diagnoses:   Laceration of head   Laceration of skin of face, initial encounter     Time reflects when diagnosis was documented in both MDM as applicable and the Disposition within this note     Time User Action Codes Description Comment    12/28/2018  4:55 AM Cristin Madi Hamilton [[id number]] Laceration of head     12/28/2018  4:56 AM Marco Guerrero Fabiennelisa Net Add [[id number]] Laceration of skin of face, initial encounter       ED Disposition     ED Disposition Condition Comment    Discharge  Bran Hays discharge to home/self care  Condition at discharge: Stable        Follow-up Information    None         Patient's Medications   Discharge Prescriptions    No medications on file     No discharge procedures on file      ED Provider  Electronically Signed by           Quin Elias MD  12/28/18 1813       Quin Elias MD  12/28/18 4763

## 2019-01-10 ENCOUNTER — TELEPHONE (OUTPATIENT)
Dept: PSYCHIATRY | Facility: CLINIC | Age: 74
End: 2019-01-10

## 2019-08-06 ENCOUNTER — TELEPHONE (OUTPATIENT)
Dept: HEMATOLOGY ONCOLOGY | Facility: CLINIC | Age: 74
End: 2019-08-06

## 2019-08-06 NOTE — TELEPHONE ENCOUNTER
TIGISTM informing [facility] that patients appt was moved up, due to scheduling conflicts with Dr Logan Whittaker schedule  Patient appt was moved up to 11:30 am with Dr Logan Whittaker at the [facility] location  [address] that if the appt does not work for their schedule they can call the office to R/S to another appt date and/or time  Patient is no longer at the [address] [place] [address]  I called and left a message for the nurse to call back to back jason that the patient brings her most recent labs with her to the appt and to give appt information  If/ When she calls back please give her the information

## 2019-08-06 NOTE — TELEPHONE ENCOUNTER
KIRK Hoang from [address] called back and I informed her that the patient has an appt at 11:30 am with Dr Courtney Holly at the [facility]  Patient needed to be R/S due to them not having a appt on the books for her  She was R/S to 9/16/19 at  2:20 pm with Dr Courtney Holly at the [facility]

## 2019-09-16 ENCOUNTER — OFFICE VISIT (OUTPATIENT)
Dept: HEMATOLOGY ONCOLOGY | Facility: CLINIC | Age: 74
End: 2019-09-16
Payer: COMMERCIAL

## 2019-09-16 VITALS
DIASTOLIC BLOOD PRESSURE: 78 MMHG | RESPIRATION RATE: 16 BRPM | OXYGEN SATURATION: 99 % | SYSTOLIC BLOOD PRESSURE: 124 MMHG | TEMPERATURE: 97.6 F | HEART RATE: 71 BPM

## 2019-09-16 DIAGNOSIS — C54.1 ENDOMETRIAL CANCER (HCC): ICD-10-CM

## 2019-09-16 DIAGNOSIS — Z85.3 HISTORY OF BILATERAL BREAST CANCER: Primary | ICD-10-CM

## 2019-09-16 PROCEDURE — 99213 OFFICE O/P EST LOW 20 MIN: CPT | Performed by: INTERNAL MEDICINE

## 2019-09-16 RX ORDER — POLYETHYLENE GLYCOL 3350 17 G/17G
17 POWDER, FOR SOLUTION ORAL DAILY
COMMUNITY

## 2019-09-16 RX ORDER — BENZTROPINE MESYLATE 1 MG/1
1 TABLET ORAL DAILY
COMMUNITY

## 2019-09-16 RX ORDER — ECHINACEA PURPUREA EXTRACT 125 MG
1 TABLET ORAL AS NEEDED
COMMUNITY

## 2019-09-16 RX ORDER — FERROUS SULFATE 325(65) MG
325 TABLET ORAL
COMMUNITY

## 2019-09-16 NOTE — PROGRESS NOTES
HPI:  Patient is here with a transporter from [address]  She has remote past history of bilateral mastectomies in 1997 for bilateral breast cancers  That was followed by 5 year of adjuvant tamoxifen  Patient did not want to go for genetic testing  In September 2018 patient underwent SHAWANDA and BSO for stage IA, grade 1 endometrial cancer  Patient did not receive intravaginal radiation  She has other medical problems  She has weakness and tiredness  She ambulates with a walker  Appetite is fair      Current Outpatient Medications:     Acetaminophen (APAP) 325 MG tablet, Take 2 tablets by mouth every 6 (six) hours as needed, Disp: , Rfl:     aspirin (ASPIRIN EC ADULT LOW STRENGTH) 81 mg EC tablet, Take 81 mg by mouth, Disp: , Rfl:     benztropine (COGENTIN) 1 mg tablet, Take 1 mg by mouth daily, Disp: , Rfl:     buPROPion (WELLBUTRIN SR) 150 mg 12 hr tablet, Take 1 tablet (150 mg total) by mouth 2 (two) times a day, Disp: 60 tablet, Rfl: 5    busPIRone (BUSPAR) 15 mg tablet, Take 1 tablet (15 mg total) by mouth 2 (two) times a day, Disp: 60 tablet, Rfl: 5    calcium acetate (PHOSLO) 667 mg capsule, Take 667 mg by mouth, Disp: , Rfl:     Calcium Acetate, Phos Binder, (CALCIUM ACETATE PO), Take by mouth, Disp: , Rfl:     divalproex sodium (DEPAKOTE SPRINKLE) 125 MG capsule, Take 2 capsules (250 mg total) by mouth 4 (four) times a day, Disp: 240 capsule, Rfl: 5    famotidine (PEPCID) 20 mg tablet, Take 20 mg by mouth, Disp: , Rfl:     ferrous sulfate 325 (65 Fe) mg tablet, Take 325 mg by mouth daily with breakfast, Disp: , Rfl:     Iron-Vitamins (THEREMS H PO), Take 1 tablet by mouth daily, Disp: , Rfl:     midodrine (PROAMATINE) 10 MG tablet, Take 1 tablet by mouth 3 (three) times a day, Disp: , Rfl:     paliperidone (INVEGA) 9 MG 24 hr tablet, Take 1 tablet (9 mg total) by mouth every morning, Disp: 30 tablet, Rfl: 5    polyethylene glycol (GLYCOLAX) powder, Take 17 g by mouth daily, Disp: , Rfl:     senna (SENNA-LAX) 8 6 MG tablet, Take by mouth, Disp: , Rfl:     sodium chloride (OCEAN) 0 65 % nasal spray, 1 spray into each nostril as needed for congestion, Disp: , Rfl:     Cholecalciferol 2000 units TABS, Take 1 capsule by mouth, Disp: , Rfl:     clonazePAM (KlonoPIN) 2 mg tablet, Take 2 mg by mouth, Disp: , Rfl:     enalapril (VASOTEC) 5 mg tablet, Take 5 mg by mouth, Disp: , Rfl:     haloperidol (HALDOL) 5 mg tablet, Take 0 5 tablets (2 5 mg total) by mouth 3 (three) times a day, Disp: 45 tablet, Rfl: 5    trihexyphenidyl (ARTANE) 2 mg tablet, Take 1 tablet (2 mg total) by mouth 2 (two) times a day (Patient not taking: Reported on 9/16/2019), Disp: 60 tablet, Rfl: 5    No Known Allergies     No history exists  ROS:  09/16/19 Reviewed 13 systems:  Presently no other neurological, cardiac, pulmonary, GI and  symptoms   No other symptoms like fever, chills, bleeding, bone pains, skin rash,  numbness,  claudication   No frequent infections  Not unusually sensitive to heat or cold  No swelling of the ankles  No swollen glands  Patient is anxious  Other symptoms are in HPI        /78 (BP Location: Left arm, Patient Position: Sitting, Cuff Size: Adult)   Pulse 71   Temp 97 6 °F (36 4 °C)   Resp 16   SpO2 99%     Physical Exam:    Patient is awake  She answers most questions appropriately  She does not appear to be in distress  There is no scleral icterus,   no oral thrush, no palpable neck mass, clear lung fields, regular heart rate, abdomen  soft and non tender, no palpable abdominal mass, no ascites, no edema of ankles, no calf tenderness, no focal neurological deficit, generalized weakness, no skin rash, no palpable lymphadenopathy in the neck and axillary areas, good arterial pulses, no clubbing  Patient is anxious  Performance status 3  Bilateral mastectomy scars  No lymphedema       IMAGING:      LABS:  Results for orders placed or performed during the hospital encounter of 08/12/18   CBC and differential   Result Value Ref Range    WBC 5 80 4 31 - 10 16 Thousand/uL    RBC 3 57 (L) 3 81 - 5 12 Million/uL    Hemoglobin 12 0 11 5 - 15 4 g/dL    Hematocrit 37 6 37 0 - 47 0 %     (H) 82 - 98 fL    MCH 33 7 26 8 - 34 3 pg    MCHC 32 0 31 4 - 37 4 g/dL    RDW 14 7 11 6 - 15 1 %    MPV 10 2 8 9 - 12 7 fL    Platelets 106 547 - 974 Thousands/uL    nRBC 0 /100 WBCs    Neutrophils Relative 50 43 - 75 %    Lymphocytes Relative 31 14 - 44 %    Monocytes Relative 17 (H) 4 - 12 %    Eosinophils Relative 2 0 - 6 %    Basophils Relative 0 0 - 1 %    Neutrophils Absolute 2 90 1 85 - 7 62 Thousands/µL    Lymphocytes Absolute 1 80 0 60 - 4 47 Thousands/µL    Monocytes Absolute 1 00 0 17 - 1 22 Thousand/µL    Eosinophils Absolute 0 10 0 00 - 0 61 Thousand/µL    Basophils Absolute 0 00 0 00 - 0 10 Thousands/µL   Basic metabolic panel   Result Value Ref Range    Sodium 140 134 - 143 mmol/L    Potassium 4 2 3 5 - 5 5 mmol/L    Chloride 106 98 - 107 mmol/L    CO2 26 21 - 31 mmol/L    ANION GAP 8 4 - 13 mmol/L    BUN 18 7 - 25 mg/dL    Creatinine 1 00 0 60 - 1 20 mg/dL    Glucose 90 65 - 140 mg/dL    Calcium 8 9 8 6 - 10 5 mg/dL    eGFR 56 ml/min/1 73sq m   Smear Review(Phlebs Do Not Order)   Result Value Ref Range    RBC Morphology abnormal     Anisocytosis Present     Macrocytes Present     Ovalocytes Present     Platelet Estimate Adequate Adequate     Labs, Imaging, & Other studies:   All pertinent labs and imaging studies were personally reviewed    Lab Results   Component Value Date     04/01/2018    K 4 2 08/12/2018     08/12/2018    CO2 26 08/12/2018    ANIONGAP 10 2 04/01/2018    BUN 18 08/12/2018    CREATININE 1 00 08/12/2018    CALCIUM 8 9 08/12/2018    AST 16 03/28/2018    ALT 9 03/28/2018    ALKPHOS 64 03/28/2018    PROT 5 9 (L) 03/28/2018    BILITOT 0 5 03/28/2018    EGFR 56 08/12/2018     Lab Results   Component Value Date    WBC 5 80 08/12/2018    HGB 12 0 08/12/2018    HCT 37 6 08/12/2018     (H) 08/12/2018     08/12/2018       On 05/23/2019 hemoglobin 12 6  WBC 5700  Platelet count 384603  Reviewed and discussed with patient  Assessment and plan:  Patient is here with a transporter from [facility]  She has remote past history of bilateral mastectomies in 1997 for bilateral breast cancers  That was followed by 5 year of adjuvant tamoxifen  Patient did not want to go for genetic testing  In September 2018 patient underwent SHAWANDA and BSO for stage IA, grade 1 endometrial cancer  Patient did not receive intravaginal radiation  She has other medical problems  She has weakness and tiredness  She ambulates with a walker  Appetite is fair  Jami Johnsonck Physical examination and blood counts as reported  She remains in remission from bilateral breast cancers  She follows with her GYN oncologist for endometrial cancer  Condition discussed and explained  Questions answered  She has follow-up appointment in our office in 6 months  Discussed the importance of eating healthy foods and exercising with her arms and legs  Goal is cure from breast cancers and endometrial cancer  Patient needs assistance in her care    1  History of bilateral breast cancer    - CBC and differential; Future  - Comprehensive metabolic panel; Future    2  Endometrial cancer [facility])          Patient voiced understanding and agreement in the discussion  Counseling / Coordination of Care   Greater than 50% of total time was spent with the patient and / or family counseling and / or coordination of care

## 2020-02-17 ENCOUNTER — TELEPHONE (OUTPATIENT)
Dept: HEMATOLOGY ONCOLOGY | Facility: CLINIC | Age: 75
End: 2020-02-17

## 2020-02-17 NOTE — TELEPHONE ENCOUNTER
Spoke to Romel Brothers and informed them that the appt that the patient was scheduled for on 3/16/20 was R/S to 4/2/20 at 10:00 am at the [place] location with Dr Anisa Aragon  They informed me that the date in the computer will change and [facility] will be informed of the appt change

## 2020-04-01 ENCOUNTER — TELEPHONE (OUTPATIENT)
Dept: HEMATOLOGY ONCOLOGY | Facility: CLINIC | Age: 75
End: 2020-04-01

## 2020-05-26 ENCOUNTER — TELEPHONE (OUTPATIENT)
Dept: HEMATOLOGY ONCOLOGY | Facility: CLINIC | Age: 75
End: 2020-05-26

## 2020-07-02 ENCOUNTER — TELEPHONE (OUTPATIENT)
Dept: HEMATOLOGY ONCOLOGY | Facility: CLINIC | Age: 75
End: 2020-07-02

## 2020-07-02 ENCOUNTER — TELEPHONE (OUTPATIENT)
Dept: HEMATOLOGY ONCOLOGY | Facility: MEDICAL CENTER | Age: 75
End: 2020-07-02

## 2020-07-02 NOTE — TELEPHONE ENCOUNTER
Called and spoke to Ollie at [place], he stated that all of the residents there had got tested for COVID a couple days ago and the results are not yet back  I offered a virtual visit, he stated they do not have access to [employer], or a smart phone to do a virtual visit  I r/s patient's appointment to 8/13/20 at 12:40 PM  I was then transferred to [facility] since they are the transportation service  I left a detailed message with the new appointment information and our address along with my name and phone number if there are any questions

## 2020-08-12 ENCOUNTER — TELEPHONE (OUTPATIENT)
Dept: HEMATOLOGY ONCOLOGY | Facility: CLINIC | Age: 75
End: 2020-08-12

## 2020-08-12 NOTE — TELEPHONE ENCOUNTER
Appointment Confirmation     Appointment with  Dr Mary Kate Umana   Appointment date & time 08/136 at 12:40pm   Location [place]   Patient verbilized Understanding  Yes

## 2020-08-13 ENCOUNTER — OFFICE VISIT (OUTPATIENT)
Dept: HEMATOLOGY ONCOLOGY | Facility: CLINIC | Age: 75
End: 2020-08-13
Payer: COMMERCIAL

## 2020-08-13 VITALS
DIASTOLIC BLOOD PRESSURE: 72 MMHG | OXYGEN SATURATION: 99 % | SYSTOLIC BLOOD PRESSURE: 128 MMHG | RESPIRATION RATE: 16 BRPM | HEART RATE: 60 BPM | TEMPERATURE: 97 F

## 2020-08-13 DIAGNOSIS — Z85.3 HISTORY OF BILATERAL BREAST CANCER: Primary | ICD-10-CM

## 2020-08-13 DIAGNOSIS — C54.1 ENDOMETRIAL CANCER (HCC): ICD-10-CM

## 2020-08-13 PROCEDURE — 99213 OFFICE O/P EST LOW 20 MIN: CPT | Performed by: INTERNAL MEDICINE

## 2020-08-13 NOTE — PROGRESS NOTES
HPI: Patient has come from [address] [place] [address] with a transporter  Patient is in a wheelchair  She has remote past history of   bilateral mastectomies in 1997 for bilateral breast cancers  That was followed by 5 year of adjuvant tamoxifen  Patient did not want to go for genetic testing but now agrees to have genetic testing and referral is made to Oncology genetic counselor  She also has history of grade 1, stage IA endometrial cancer, diagnosed in September 2018 when she underwent  SHAWANDA and BSO  Patient did not receive intravaginal radiation     She  follows with her gyn oncologist and was last seen in June 2020    Airam Crenshaw She ambulates with a walker  She has weakness and tiredness  Arthritis              Current Outpatient Medications:     Acetaminophen (APAP) 325 MG tablet, Take 2 tablets by mouth every 6 (six) hours as needed, Disp: , Rfl:     aspirin (ASPIRIN EC ADULT LOW STRENGTH) 81 mg EC tablet, Take 81 mg by mouth, Disp: , Rfl:     benztropine (COGENTIN) 1 mg tablet, Take 1 mg by mouth daily, Disp: , Rfl:     buPROPion (WELLBUTRIN SR) 150 mg 12 hr tablet, Take 1 tablet (150 mg total) by mouth 2 (two) times a day, Disp: 60 tablet, Rfl: 5    busPIRone (BUSPAR) 15 mg tablet, Take 1 tablet (15 mg total) by mouth 2 (two) times a day, Disp: 60 tablet, Rfl: 5    calcium acetate (PHOSLO) 667 mg capsule, Take 667 mg by mouth, Disp: , Rfl:     Calcium Acetate, Phos Binder, (CALCIUM ACETATE PO), Take by mouth, Disp: , Rfl:     Cholecalciferol 2000 units TABS, Take 1 capsule by mouth, Disp: , Rfl:     clonazePAM (KlonoPIN) 2 mg tablet, Take 2 mg by mouth, Disp: , Rfl:     divalproex sodium (DEPAKOTE SPRINKLE) 125 MG capsule, Take 2 capsules (250 mg total) by mouth 4 (four) times a day, Disp: 240 capsule, Rfl: 5    enalapril (VASOTEC) 5 mg tablet, Take 5 mg by mouth, Disp: , Rfl:     famotidine (PEPCID) 20 mg tablet, Take 20 mg by mouth, Disp: , Rfl:     ferrous sulfate 325 (65 Fe) mg tablet, Take 325 mg by mouth daily with breakfast, Disp: , Rfl:     haloperidol (HALDOL) 5 mg tablet, Take 0 5 tablets (2 5 mg total) by mouth 3 (three) times a day, Disp: 45 tablet, Rfl: 5    Iron-Vitamins (THEREMS H PO), Take 1 tablet by mouth daily, Disp: , Rfl:     midodrine (PROAMATINE) 10 MG tablet, Take 1 tablet by mouth 3 (three) times a day, Disp: , Rfl:     paliperidone (INVEGA) 9 MG 24 hr tablet, Take 1 tablet (9 mg total) by mouth every morning, Disp: 30 tablet, Rfl: 5    polyethylene glycol (GLYCOLAX) powder, Take 17 g by mouth daily, Disp: , Rfl:     senna (SENNA-LAX) 8 6 MG tablet, Take by mouth, Disp: , Rfl:     sodium chloride (OCEAN) 0 65 % nasal spray, 1 spray into each nostril as needed for congestion, Disp: , Rfl:     trihexyphenidyl (ARTANE) 2 mg tablet, Take 1 tablet (2 mg total) by mouth 2 (two) times a day (Patient not taking: Reported on 9/16/2019), Disp: 60 tablet, Rfl: 5    No Known Allergies    Oncology History    No history exists  ROS:  08/13/20 Reviewed 13 systems: See symptoms in HPI:  Weakness  Tiredness  Gait problem  Arthritic symptoms  Presently no headaches, seizures, dizziness, diplopia, dysphagia, hoarseness, chest pain, palpitations, shortness of breath, cough, hemoptysis, abdominal pain, nausea, vomiting, change in bowel habits, melena, hematuria, fever, chills, bleeding, bone pains, skin rash, weight loss,  numbness,  claudication   No frequent infections  Not unusually sensitive to heat or cold  No swelling of the ankles  No swollen glands  Patient is anxious         /72 (BP Location: Right arm, Patient Position: Sitting, Cuff Size: Adult)   Pulse 60   Temp (!) 97 °F (36 1 °C)   Resp 16   SpO2 99%     Physical Exam:  Alert, oriented, not in distress, no icterus, no oral thrush, no palpable neck mass, clear lung fields, regular heart rate, abdomen  soft and non tender, patient is in wheelchair,  no edema of ankles, no calf tenderness, no focal neurological deficit, generalized weakness, no skin rash, no palpable lymphadenopathy in the neck and axillary areas, good arterial pulses  Patient is anxious  Performance status 3  Bilateral mastectomy scars  No lymphedema       IMAGING:  CT CHEST ABDOMEN AND PELVIS IRENE [facility] [id number]  Result Impression   Impression:   No acute injury in the chest, abdomen, or pelvis  [id number]         Labs, Imaging, & Other studies:   All pertinent labs and imaging studies were personally reviewed      Reviewed and discussed with patient  Assessment and plan:  Patient has come from [address] [place] [address] with a transporter  Patient is in a wheelchair  She has remote past history of   bilateral mastectomies in 1997 for bilateral breast cancers  That was followed by 5 year of adjuvant tamoxifen  Patient did not want to go for genetic testing but now agrees to have genetic testing and referral is made to Oncology genetic counselor  She also has history of grade 1, stage IA endometrial cancer, diagnosed in September 2018 when she underwent  SHAWANDA and BSO  Patient did not receive intravaginal radiation     She  follows with her gyn oncologist and was last seen in June 2020    Agustin Mckeon She ambulates with a walker  She has weakness and tiredness  Arthritis      Physical examination and blood counts as reported  She remains in remission from bilateral breast cancers  She follows with her GYN oncologist for endometrial cancer  Condition discussed and explained  Questions answered  She has follow-up appointment in our office in 8 months  She will have genetic testing as above  Discussed the importance of eating healthy foods and exercising with her arms and legs  Goal is cure from breast cancers and endometrial cancer  Patient needs assistance in her care  Discussed precautions against coronavirus   1   History of bilateral breast cancer    - Ambulatory Referral to Oncology Genetics; Future  - CBC and differential; Future  - Comprehensive metabolic panel; Future    2  Endometrial cancer [facility])                  Patient voiced understanding and agreement in the discussion  Counseling / Coordination of Care   Greater than 50% of total time was spent with the patient and / or family counseling and / or coordination of care

## 2020-08-14 ENCOUNTER — TELEPHONE (OUTPATIENT)
Dept: SURGICAL ONCOLOGY | Facility: CLINIC | Age: 75
End: 2020-08-14

## 2020-08-14 NOTE — TELEPHONE ENCOUNTER
Spoke to [facility] where pt resides  She took information regarding genetics referral along with phone number to schedule and she will discuss this with the pt and call us should pt want to schedule with genetics

## 2020-08-20 ENCOUNTER — TELEPHONE (OUTPATIENT)
Dept: SURGICAL ONCOLOGY | Facility: CLINIC | Age: 75
End: 2020-08-20

## 2020-08-20 NOTE — TELEPHONE ENCOUNTER
Genetics New Patient Intake Form   Patient Details:     Johnathan Allen    1945    [id number]    Background Information:    "On Hold (Urgent Slot)" is set aside for Pancreatic, Ovarian, and Scheduled Surgery Patients  If it is not scheduled by the previous Friday, any patient can be scheduled in it  Who is calling to schedule? If not self, relationship to patient? carmine   Referring Provider fide   Is this a personal or family history? personal   If personal history, what age were you at diagnosis? Caller unaware   What is the type of tumor? Breast and uterine ca    Pancreatic and Ovarian needs to be scheduled in the "On Hold (Urgent Slot)"   Do you have a pending surgery for your cancer diagnosis? No    If yes: needs to be scheduled in the "On Hold (Urgent Slot)"   Referring Provider Department Med onc   Did the patient schedule an appointment? Yes   List Appointment Date and Provider Name  jayeshman   12/8/2020   Scheduling Information:   Preferred [place]: [facility]   Are there any dates/time the patient cannot be seen?     Miscellaneous:    After completing the above information, please route to Oncology Genetics

## 2020-12-04 ENCOUNTER — TELEPHONE (OUTPATIENT)
Dept: SURGICAL ONCOLOGY | Facility: CLINIC | Age: 75
End: 2020-12-04

## 2020-12-07 ENCOUNTER — TELEPHONE (OUTPATIENT)
Dept: HEMATOLOGY ONCOLOGY | Facility: CLINIC | Age: 75
End: 2020-12-07

## 2020-12-08 ENCOUNTER — TELEPHONE (OUTPATIENT)
Dept: GENETICS | Facility: CLINIC | Age: 75
End: 2020-12-08

## 2021-04-15 ENCOUNTER — OFFICE VISIT (OUTPATIENT)
Dept: HEMATOLOGY ONCOLOGY | Facility: CLINIC | Age: 76
End: 2021-04-15
Payer: COMMERCIAL

## 2021-04-15 VITALS
OXYGEN SATURATION: 94 % | RESPIRATION RATE: 16 BRPM | TEMPERATURE: 97.4 F | DIASTOLIC BLOOD PRESSURE: 70 MMHG | HEART RATE: 64 BPM | SYSTOLIC BLOOD PRESSURE: 126 MMHG

## 2021-04-15 DIAGNOSIS — C54.1 ENDOMETRIAL CANCER (HCC): ICD-10-CM

## 2021-04-15 DIAGNOSIS — Z85.3 HISTORY OF BILATERAL BREAST CANCER: Primary | ICD-10-CM

## 2021-04-15 DIAGNOSIS — Z78.9 NEED FOR FOLLOW-UP BY SOCIAL WORKER: ICD-10-CM

## 2021-04-15 PROCEDURE — 99213 OFFICE O/P EST LOW 20 MIN: CPT | Performed by: INTERNAL MEDICINE

## 2021-04-15 RX ORDER — OLANZAPINE 2.5 MG/1
TABLET ORAL
COMMUNITY
Start: 2021-03-01

## 2021-04-15 NOTE — PROGRESS NOTES
HPI:  Follow-up visit for history of bilateral breast cancers in 1997 and patient had bilateral mastectomies and adjuvant tamoxifen for 5 years  There has not been recurrent or metastatic disease from breast cancer  Patient does not want to go for genetic testing even though she had grade 1, stage IA endometrial cancer in 2018 and she had SHAWANDA and BSO and she follows with her gyn oncologist   She mentioned that she understands and makes her on decisions and signs her on consent    Patient has come from [facility] [address] with a transporter  Patient is in a wheelchair  She ambulates with a walker  She has weakness and tiredness  Has Arthritis              Current Outpatient Medications:     Acetaminophen (APAP) 325 MG tablet, Take 2 tablets by mouth every 6 (six) hours as needed, Disp: , Rfl:     aspirin (ASPIRIN EC ADULT LOW STRENGTH) 81 mg EC tablet, Take 81 mg by mouth, Disp: , Rfl:     benztropine (COGENTIN) 1 mg tablet, Take 1 mg by mouth daily, Disp: , Rfl:     buPROPion (WELLBUTRIN SR) 150 mg 12 hr tablet, Take 1 tablet (150 mg total) by mouth 2 (two) times a day, Disp: 60 tablet, Rfl: 5    busPIRone (BUSPAR) 15 mg tablet, Take 1 tablet (15 mg total) by mouth 2 (two) times a day (Patient taking differently: Take 5 mg by mouth daily ), Disp: 60 tablet, Rfl: 5    calcium acetate (PHOSLO) 667 mg capsule, Take 667 mg by mouth, Disp: , Rfl:     Calcium Acetate, Phos Binder, (CALCIUM ACETATE PO), Take by mouth, Disp: , Rfl:     Cholecalciferol 2000 units TABS, Take 1 capsule by mouth, Disp: , Rfl:     divalproex sodium (DEPAKOTE SPRINKLE) 125 MG capsule, Take 2 capsules (250 mg total) by mouth 4 (four) times a day, Disp: 240 capsule, Rfl: 5    famotidine (PEPCID) 20 mg tablet, Take 20 mg by mouth, Disp: , Rfl:     ferrous sulfate 325 (65 Fe) mg tablet, Take 325 mg by mouth daily with breakfast, Disp: , Rfl:     Iron-Vitamins (THEREMS H PO), Take 1 tablet by mouth daily, Disp: , Rfl:    midodrine (PROAMATINE) 10 MG tablet, Take 1 tablet by mouth 3 (three) times a day, Disp: , Rfl:     OLANZapine (ZyPREXA) 2 5 mg tablet, , Disp: , Rfl:     paliperidone (INVEGA) 9 MG 24 hr tablet, Take 1 tablet (9 mg total) by mouth every morning, Disp: 30 tablet, Rfl: 5    polyethylene glycol (GLYCOLAX) powder, Take 17 g by mouth daily, Disp: , Rfl:     senna (SENNA-LAX) 8 6 MG tablet, Take by mouth, Disp: , Rfl:     sodium chloride (OCEAN) 0 65 % nasal spray, 1 spray into each nostril as needed for congestion, Disp: , Rfl:     clonazePAM (KlonoPIN) 2 mg tablet, Take 2 mg by mouth, Disp: , Rfl:     enalapril (VASOTEC) 5 mg tablet, Take 5 mg by mouth, Disp: , Rfl:     haloperidol (HALDOL) 5 mg tablet, Take 0 5 tablets (2 5 mg total) by mouth 3 (three) times a day (Patient not taking: Reported on 4/15/2021), Disp: 45 tablet, Rfl: 5    trihexyphenidyl (ARTANE) 2 mg tablet, Take 1 tablet (2 mg total) by mouth 2 (two) times a day (Patient not taking: Reported on 9/16/2019), Disp: 60 tablet, Rfl: 5    No Known Allergies    Oncology History    No history exists  ROS:  04/15/21 Reviewed 12 systems: See symptoms in HPI:   Presently no other neurological, cardiac, pulmonary, GI and  symptoms other than listed in HPI  No   fever, chills, bleeding, bone pains, skin rash, weight loss,  Numbness  No frequent infections  Not unusually sensitive to heat or cold  No swelling of the ankles  No swollen glands  Patient is anxious         /70 (BP Location: Left arm, Patient Position: Sitting, Cuff Size: Adult)   Pulse 64   Temp (!) 97 4 °F (36 3 °C)   Resp 16   SpO2 (!) 64%     Physical Exam:  Oxygen saturation 94% and not 64% according to my medical assistant  Alert, oriented, not in distress, stable vitals, no icterus, no oral thrush, no palpable neck mass, clear lung fields, regular heart rate, abdomen  soft and non tender, patient is in wheelchair,  no edema of ankles, no calf tenderness, no focal neurological deficit, generalized weakness, no skin rash, no palpable lymphadenopathy in the neck and axillary areas, good arterial pulses  Patient is anxious  Performance status 3  Bilateral mastectomy scars  No lymphedema       IMAGING:        Labs, Imaging, & Other studies:     No labs and no imaging studies      Reviewed and discussed with patient  Assessment and plan: Follow-up visit for history of bilateral breast cancers in 1997 and patient had bilateral mastectomies and adjuvant tamoxifen for 5 years  There has not been recurrent or metastatic disease from breast cancer  Patient does not want to go for genetic testing even though she had grade 1, stage IA endometrial cancer in 2018 and she had SHAWANDA and BSO and she follows with her gyn oncologist   She mentioned that she understands and makes her on decisions and signs her on consent    Patient has come from [address] with a transporter  Patient is in a wheelchair  She ambulates with a walker  She has weakness and tiredness  Has Arthritis      Junior Carlos Physical examination and blood counts as reported  She remains in remission from bilateral breast cancers  She follows with her GYN oncologist for endometrial cancer  Condition discussed and explained  Questions answered  She has decided against genetic testing  She has been in remission from breast cancers since 1997  She could be discharged from our services and she was okay with that  Follow-up visit p r n  In our office for problems related to our speciality  Discussed the importance of eating healthy foods and exercising with her arms and legs  Goal is cure from breast cancers and endometrial cancer  Patient needs assistance in her care  Discussed precautions against coronavirus  See diagnoses and orders below  1  History of bilateral breast cancer      2  Endometrial cancer ([facility] [address] )      3   Need for follow-up by     - Ambulatory referral to social work care management program; Future            Follow-up p r n            Patient voiced understanding and agrees      Counseling / Coordination of Care   Provided counseling and support

## 2021-04-22 ENCOUNTER — PATIENT OUTREACH (OUTPATIENT)
Dept: CASE MANAGEMENT | Facility: HOSPITAL | Age: 76
End: 2021-04-22

## 2021-04-22 NOTE — PROGRESS NOTES
MSW received a Distress Thermometer from Med Onc, where the pt self scored  A 5 to indicate her level of stress  She indicated fears, nervousness and worry as her psychosocial stressors  Pt's chart indicated that pt lives at a skilled nursing facility and MSW reached out to facility to speak with pt directly  Facility nurse was able to place the pt on the phone, however the pt appeared to be confused  She had no recollection of her appointment with oncology  Pt sounded pleasant in her confusion and denied any issues at this time  Facility RN reports this is the pt's baseline  Nursing reports no issues or concerns  Support offered

## 2021-06-03 ENCOUNTER — VBI (OUTPATIENT)
Dept: ADMINISTRATIVE | Facility: OTHER | Age: 76
End: 2021-06-03

## 2021-11-22 ENCOUNTER — VBI (OUTPATIENT)
Dept: ADMINISTRATIVE | Facility: OTHER | Age: 76
End: 2021-11-22